# Patient Record
Sex: FEMALE | Race: WHITE | NOT HISPANIC OR LATINO | Employment: OTHER | URBAN - METROPOLITAN AREA
[De-identification: names, ages, dates, MRNs, and addresses within clinical notes are randomized per-mention and may not be internally consistent; named-entity substitution may affect disease eponyms.]

---

## 2017-01-09 ENCOUNTER — LAB VISIT (OUTPATIENT)
Dept: LAB | Facility: HOSPITAL | Age: 82
End: 2017-01-09
Attending: INTERNAL MEDICINE
Payer: MEDICARE

## 2017-01-09 ENCOUNTER — TELEPHONE (OUTPATIENT)
Dept: RHEUMATOLOGY | Facility: CLINIC | Age: 82
End: 2017-01-09

## 2017-01-09 ENCOUNTER — OFFICE VISIT (OUTPATIENT)
Dept: INTERNAL MEDICINE | Facility: CLINIC | Age: 82
End: 2017-01-09
Payer: COMMERCIAL

## 2017-01-09 VITALS
WEIGHT: 109.38 LBS | TEMPERATURE: 98 F | SYSTOLIC BLOOD PRESSURE: 129 MMHG | HEART RATE: 86 BPM | OXYGEN SATURATION: 94 % | BODY MASS INDEX: 20.65 KG/M2 | DIASTOLIC BLOOD PRESSURE: 77 MMHG | HEIGHT: 61 IN

## 2017-01-09 DIAGNOSIS — C49.22 LEIOMYOSARCOMA OF LEFT THIGH: ICD-10-CM

## 2017-01-09 DIAGNOSIS — Z00.00 ENCOUNTER FOR PREVENTIVE HEALTH EXAMINATION: ICD-10-CM

## 2017-01-09 DIAGNOSIS — M81.0 OP (OSTEOPOROSIS): ICD-10-CM

## 2017-01-09 DIAGNOSIS — E55.9 VITAMIN D DEFICIENCY: Primary | ICD-10-CM

## 2017-01-09 DIAGNOSIS — E55.9 VITAMIN D DEFICIENCY: ICD-10-CM

## 2017-01-09 LAB
25(OH)D3+25(OH)D2 SERPL-MCNC: >96 NG/ML
ALBUMIN SERPL BCP-MCNC: 3.8 G/DL
ALP SERPL-CCNC: 81 U/L
ALT SERPL W/O P-5'-P-CCNC: 12 U/L
ANION GAP SERPL CALC-SCNC: 10 MMOL/L
AST SERPL-CCNC: 23 U/L
BASOPHILS # BLD AUTO: 0.03 K/UL
BASOPHILS NFR BLD: 0.4 %
BILIRUB SERPL-MCNC: 0.3 MG/DL
BUN SERPL-MCNC: 23 MG/DL
CALCIUM SERPL-MCNC: 9.7 MG/DL
CHLORIDE SERPL-SCNC: 104 MMOL/L
CHOLEST/HDLC SERPL: 3.2 {RATIO}
CO2 SERPL-SCNC: 24 MMOL/L
CREAT SERPL-MCNC: 1 MG/DL
DIFFERENTIAL METHOD: ABNORMAL
EOSINOPHIL # BLD AUTO: 0.1 K/UL
EOSINOPHIL NFR BLD: 1.7 %
ERYTHROCYTE [DISTWIDTH] IN BLOOD BY AUTOMATED COUNT: 13.1 %
EST. GFR  (AFRICAN AMERICAN): >60 ML/MIN/1.73 M^2
EST. GFR  (NON AFRICAN AMERICAN): 52.2 ML/MIN/1.73 M^2
GLUCOSE SERPL-MCNC: 92 MG/DL
HCT VFR BLD AUTO: 42.6 %
HDL/CHOLESTEROL RATIO: 30.8 %
HDLC SERPL-MCNC: 227 MG/DL
HDLC SERPL-MCNC: 70 MG/DL
HGB BLD-MCNC: 14.5 G/DL
LDLC SERPL CALC-MCNC: 139.8 MG/DL
LYMPHOCYTES # BLD AUTO: 2 K/UL
LYMPHOCYTES NFR BLD: 26.9 %
MCH RBC QN AUTO: 32.4 PG
MCHC RBC AUTO-ENTMCNC: 34 %
MCV RBC AUTO: 95 FL
MONOCYTES # BLD AUTO: 0.6 K/UL
MONOCYTES NFR BLD: 8.6 %
NEUTROPHILS # BLD AUTO: 4.7 K/UL
NEUTROPHILS NFR BLD: 62.1 %
NONHDLC SERPL-MCNC: 157 MG/DL
PLATELET # BLD AUTO: 263 K/UL
PMV BLD AUTO: 9.8 FL
POTASSIUM SERPL-SCNC: 4.5 MMOL/L
PROT SERPL-MCNC: 7.2 G/DL
RBC # BLD AUTO: 4.48 M/UL
SODIUM SERPL-SCNC: 138 MMOL/L
T4 FREE SERPL-MCNC: 1.04 NG/DL
TRIGL SERPL-MCNC: 86 MG/DL
TSH SERPL DL<=0.005 MIU/L-ACNC: 4.43 UIU/ML
WBC # BLD AUTO: 7.48 K/UL

## 2017-01-09 PROCEDURE — 99397 PER PM REEVAL EST PAT 65+ YR: CPT | Mod: 25,S$GLB,, | Performed by: INTERNAL MEDICINE

## 2017-01-09 PROCEDURE — 90662 IIV NO PRSV INCREASED AG IM: CPT | Mod: S$GLB,,, | Performed by: INTERNAL MEDICINE

## 2017-01-09 PROCEDURE — 84439 ASSAY OF FREE THYROXINE: CPT

## 2017-01-09 PROCEDURE — 1160F RVW MEDS BY RX/DR IN RCRD: CPT | Mod: S$GLB,,, | Performed by: INTERNAL MEDICINE

## 2017-01-09 PROCEDURE — 90732 PPSV23 VACC 2 YRS+ SUBQ/IM: CPT | Mod: S$GLB,,, | Performed by: INTERNAL MEDICINE

## 2017-01-09 PROCEDURE — 90471 IMMUNIZATION ADMIN: CPT | Mod: S$GLB,,, | Performed by: INTERNAL MEDICINE

## 2017-01-09 PROCEDURE — 80061 LIPID PANEL: CPT

## 2017-01-09 PROCEDURE — 36415 COLL VENOUS BLD VENIPUNCTURE: CPT | Mod: PO

## 2017-01-09 PROCEDURE — 80053 COMPREHEN METABOLIC PANEL: CPT

## 2017-01-09 PROCEDURE — 99999 PR PBB SHADOW E&M-EST. PATIENT-LVL III: CPT | Mod: PBBFAC,,, | Performed by: INTERNAL MEDICINE

## 2017-01-09 PROCEDURE — 1159F MED LIST DOCD IN RCRD: CPT | Mod: S$GLB,,, | Performed by: INTERNAL MEDICINE

## 2017-01-09 PROCEDURE — 1157F ADVNC CARE PLAN IN RCRD: CPT | Mod: S$GLB,,, | Performed by: INTERNAL MEDICINE

## 2017-01-09 PROCEDURE — 84443 ASSAY THYROID STIM HORMONE: CPT

## 2017-01-09 PROCEDURE — 90472 IMMUNIZATION ADMIN EACH ADD: CPT | Mod: S$GLB,,, | Performed by: INTERNAL MEDICINE

## 2017-01-09 PROCEDURE — 82306 VITAMIN D 25 HYDROXY: CPT

## 2017-01-09 PROCEDURE — 85025 COMPLETE CBC W/AUTO DIFF WBC: CPT

## 2017-01-09 NOTE — PROGRESS NOTES
"Subjective:       Patient ID: Esperanza Rubalcava is a 83 y.o. female.    Chief Complaint: No chief complaint on file.    HPI Comments: Here for f/u medical problems and preventive exam.  Never started on Prolia.  PT for balance and arm strain, PT thinks would benefit from more sessions.  Taking twice weekly vit D.  Energy ok.  No f/c/sw/coguh.  No cp/sob/palp.  BMs and urine normal.      HM: 1/17 fluvax, 1/16 [id number], 1/17 today booster [id number], 9/10 TD, 1/11 zostervax, 2/16 BMD hi fx risk rec Prolia, Cscope in past normal.        Review of Systems   Constitutional: Negative for appetite change, chills, diaphoresis and fever.   HENT: Negative for congestion, ear pain, rhinorrhea, sinus pressure and sore throat.    Respiratory: Negative for cough, chest tightness and shortness of breath.    Cardiovascular: Negative for chest pain and palpitations.   Gastrointestinal: Negative for blood in stool, constipation, diarrhea, nausea and vomiting.   Genitourinary: Negative for dysuria, frequency, hematuria, menstrual problem, urgency and vaginal discharge.   Musculoskeletal: Negative for arthralgias.   Skin: Negative for rash.   Neurological: Negative for dizziness and headaches.   Psychiatric/Behavioral: Negative for sleep disturbance. The patient is not nervous/anxious.        Objective:     Visit Vitals    /77 (BP Location: Right arm, Patient Position: Sitting, BP Method: Automatic)    Pulse 86    Temp 97.6 °F (36.4 °C) (Tympanic)    Ht 5' 1" (1.549 m)    Wt 49.6 kg (109 lb 5.6 oz)    SpO2 (!) 94%    BMI 20.66 kg/m2       Physical Exam   Constitutional: She is oriented to person, place, and time. She appears well-developed and well-nourished.   HENT:   Right Ear: External ear normal. Tympanic membrane is not injected.   Left Ear: External ear normal. Tympanic membrane is not injected.   Mouth/Throat: Oropharynx is clear and moist.   Eyes: Conjunctivae are normal.   Neck: Normal range of motion. Neck supple. " Carotid bruit is not present. No thyroid mass and no thyromegaly present.   Cardiovascular: Normal rate, regular rhythm and intact distal pulses.  Exam reveals no gallop and no friction rub.    No murmur heard.  Pulmonary/Chest: Effort normal and breath sounds normal. She has no wheezes. She has no rales.   Abdominal: Soft. Bowel sounds are normal. She exhibits no mass. There is no hepatosplenomegaly. There is no tenderness.   Musculoskeletal: She exhibits no edema.   Lymphadenopathy:     She has no cervical adenopathy.   Neurological: She is alert and oriented to person, place, and time.   Skin: Skin is warm. No rash noted.   Psychiatric: She has a normal mood and affect.       Assessment:       1. Vitamin D deficiency    2. OP (osteoporosis)    3. Leiomyosarcoma of left thigh    4. Encounter for preventive health examination        Plan:       Diagnoses and all orders for this visit:    Vitamin D deficiency-check lab.  -     Vitamin D; Future    OP (osteoporosis)- labs now, sched Rheum appt for prolia.  -     Ambulatory consult to Rheumatology    Leiomyosarcoma of left thigh    Encounter for preventive health examination- utd.  -     Comprehensive metabolic panel; Future  -     Lipid panel; Future  -     CBC auto differential; Future  -     TSH; Future  -     Vitamin D; Future  -     Pneumococcal Polysaccharide Vaccine (23 Valent) (SQ/IM)    RTC 6mo.

## 2017-01-09 NOTE — MR AVS SNAPSHOT
[facility]  [address] [place] [address]  Phone: [phone]  Fax: [phone]                  Esperanza Rubalcava   2017 1:40 PM   Office Visit    Description:  Female : 1933   Provider:  Slime Kim MD   Department:  [facility] - Internal Medicine           Diagnoses this Visit        Comments    Vitamin D deficiency    -  Primary     OP (osteoporosis)         Leiomyosarcoma of left thigh         Encounter for preventive health examination                To Do List           Future Appointments        Provider Department Dept Phone    2017 5:30 PM LAB, SAME DAY [facility] [phone]    7/10/2017 1:00 PM Slime Kim MD [facility] [phone]      Goals (5 Years of Data)     None      Follow-Up and Disposition     Return in about 6 months (around 2017).      [facility]     [facility] -  Assistance  Registered nurses in the [facility] provide clinical advisement, health education, appointment booking, and other advisory services.  Call for this free service at [phone].             Medications           Message regarding Medications     Verify the changes and/or additions to your medication regime listed below are the same as discussed with your clinician today.  If any of these changes or additions are incorrect, please notify your healthcare provider.             Verify that the below list of medications is an accurate representation of the medications you are currently taking.  If none reported, the list may be blank. If incorrect, please contact your healthcare provider. Carry this list with you in case of emergency.           Current Medications     cholecalciferol, vitamin D3, (DECARA) 50,000 unit capsule Take 1 capsule (50,000 Units total) by mouth every Monday and Thursday.    cyanocobalamin, vitamin B-12, (VITAMIN B-12) 1,000 mcg Subl Place 1 Deisi under  "the tongue once daily.           Clinical Reference Information           Vital Signs - Last Recorded  Most recent update: 1/9/2017  1:52 PM by Lázaro Yip MA    BP Pulse Temp Ht    129/77 (BP Location: Right arm, Patient Position: Sitting, BP Method: Automatic) 86 97.6 °F (36.4 °C) (Tympanic) 5' 1" (1.549 m)    Wt SpO2 BMI    49.6 kg (109 lb 5.6 oz) (!) 94% 20.66 kg/m2      Blood Pressure          Most Recent Value    BP  129/77      Allergies as of 1/9/2017     No Known Allergies      Immunizations Administered on Date of Encounter - 1/9/2017     Name Date Dose VIS Date Route    Pneumococcal Polysaccharide - 23 Valent  Incomplete 0.5 mL 4/24/2015 Intramuscular      Orders Placed During Today's Visit      Normal Orders This Visit    Ambulatory consult to Rheumatology     Pneumococcal Polysaccharide Vaccine (23 Valent) (SQ/IM)     Future Labs/Procedures Expected by Expires    CBC auto differential  1/9/2017 1/9/2018    Comprehensive metabolic panel  1/9/2017 1/9/2018    Lipid panel  1/9/2017 1/9/2018    TSH  1/9/2017 1/9/2018    Vitamin D  As directed 1/9/2018      "

## 2017-02-21 ENCOUNTER — LAB VISIT (OUTPATIENT)
Dept: LAB | Facility: HOSPITAL | Age: 82
End: 2017-02-21
Attending: INTERNAL MEDICINE
Payer: MEDICARE

## 2017-02-21 ENCOUNTER — OFFICE VISIT (OUTPATIENT)
Dept: RHEUMATOLOGY | Facility: CLINIC | Age: 82
End: 2017-02-21
Payer: MEDICARE

## 2017-02-21 VITALS
SYSTOLIC BLOOD PRESSURE: 168 MMHG | HEIGHT: 61 IN | DIASTOLIC BLOOD PRESSURE: 84 MMHG | BODY MASS INDEX: 21.06 KG/M2 | WEIGHT: 111.56 LBS | HEART RATE: 97 BPM

## 2017-02-21 DIAGNOSIS — M81.0 OP (OSTEOPOROSIS): ICD-10-CM

## 2017-02-21 DIAGNOSIS — M81.0 OP (OSTEOPOROSIS): Primary | ICD-10-CM

## 2017-02-21 DIAGNOSIS — Z91.81 RISK FOR FALLS: ICD-10-CM

## 2017-02-21 DIAGNOSIS — C49.22 LEIOMYOSARCOMA OF LEFT THIGH: ICD-10-CM

## 2017-02-21 DIAGNOSIS — E55.9 VITAMIN D DEFICIENCY: ICD-10-CM

## 2017-02-21 DIAGNOSIS — S52.501D CLOSED FRACTURE OF DISTAL END OF RIGHT RADIUS WITH ROUTINE HEALING, UNSPECIFIED FRACTURE MORPHOLOGY, SUBSEQUENT ENCOUNTER: ICD-10-CM

## 2017-02-21 LAB
ALBUMIN SERPL BCP-MCNC: 3.6 G/DL
ALP SERPL-CCNC: 84 U/L
ALT SERPL W/O P-5'-P-CCNC: 10 U/L
ANION GAP SERPL CALC-SCNC: 9 MMOL/L
AST SERPL-CCNC: 19 U/L
BILIRUB SERPL-MCNC: 0.2 MG/DL
BUN SERPL-MCNC: 19 MG/DL
CALCIUM SERPL-MCNC: 9.9 MG/DL
CHLORIDE SERPL-SCNC: 109 MMOL/L
CO2 SERPL-SCNC: 25 MMOL/L
CREAT SERPL-MCNC: 1.2 MG/DL
EST. GFR  (AFRICAN AMERICAN): 48 ML/MIN/1.73 M^2
EST. GFR  (NON AFRICAN AMERICAN): 42 ML/MIN/1.73 M^2
GLUCOSE SERPL-MCNC: 93 MG/DL
POTASSIUM SERPL-SCNC: 4.2 MMOL/L
PROT SERPL-MCNC: 7 G/DL
SODIUM SERPL-SCNC: 143 MMOL/L

## 2017-02-21 PROCEDURE — 80053 COMPREHEN METABOLIC PANEL: CPT | Mod: PO

## 2017-02-21 PROCEDURE — 36415 COLL VENOUS BLD VENIPUNCTURE: CPT | Mod: PO

## 2017-02-21 PROCEDURE — 99214 OFFICE O/P EST MOD 30 MIN: CPT | Mod: S$GLB,,, | Performed by: INTERNAL MEDICINE

## 2017-02-21 PROCEDURE — 99999 PR PBB SHADOW E&M-EST. PATIENT-LVL III: CPT | Mod: PBBFAC,,, | Performed by: INTERNAL MEDICINE

## 2017-02-21 NOTE — PROGRESS NOTES
"Subjective:       Patient ID: Esperanza Rubalcava is a 84 y.o. female.    Chief Complaint: Osteoporosis    HPI   Referred by PCP For management of OP, she has hx of distal end of R radius fracture. She actually saw Jennifer Schneider for OP 4/4/2016. It was noted that she took fosamax many years ago but doesn't remember the duration of therapy. She had R wrist in a cast from distal R elbow to MCP joints . She was diagnosed with severe OP and also vitamin D deficiency. Paln was for Prolia and started ergo 50K weekly.    She received Prolia 5/2/2016 and she is here today to make sure she gets her next injection.   She just stopped the vitamin D tablet she was taking twice weekly because Vitamin D >96. She likes cheese and eats dairy products.   Since she has fallen she has been with one of her three children.  She uses a walker no recent falls.    Review of Systems   Constitutional: Negative for chills and fever.   HENT: Negative.    Eyes: Negative.    Respiratory: Negative.    Cardiovascular: Negative.    Gastrointestinal: Negative.    Endocrine: Negative.    Genitourinary: Negative.    Musculoskeletal: Negative.    Neurological: Negative.    Hematological: Negative.    Psychiatric/Behavioral: Negative.          Objective:     Visit Vitals    BP (!) 168/84    Pulse 97    Ht 5' 1" (1.549 m)    Wt 50.6 kg (111 lb 8.8 oz)    BMI 21.08 kg/m2        Physical Exam   Vitals reviewed.  Constitutional: She is oriented to person, place, and time and well-developed, well-nourished, and in no distress. No distress.   HENT:   Head: Normocephalic and atraumatic.   Right Ear: External ear normal.   Left Ear: External ear normal.   Mouth/Throat: Oropharynx is clear and moist.   Dentition good     Eyes: Conjunctivae are normal. Pupils are equal, round, and reactive to light. Right eye exhibits no discharge. Left eye exhibits no discharge. No scleral icterus.   Neck: Neck supple.   Cardiovascular: Normal rate, regular rhythm and normal " heart sounds.    Pulmonary/Chest: Effort normal. No respiratory distress.   Neurological: She is alert and oriented to person, place, and time. No cranial nerve deficit. She exhibits normal muscle tone.   Hip flexors and quads 5/5  Can get out of chair with minimal hand use but balance is poor     Skin: Skin is warm and dry. No rash noted. She is not diaphoretic. No erythema.     Psychiatric: Mood, memory, affect and judgment normal.   Musculoskeletal: Normal range of motion. She exhibits no tenderness.         LABS REVIEWED   Ref. Range 10/24/2011 10:08 9/17/2012 08:37 4/14/2016 14:57 1/9/2017 15:13   Vit D, 25-Hydroxy Latest Ref Range: 30 - 96 ng/mL 11 (L) 10 (L) 18 (L) >96 (A)     Results for GREGOR LOPEZ (MRN [id number]) as of 2/21/2017 14:26   Ref. Range 1/9/2017 15:13   WBC Latest Ref Range: 3.90 - 12.70 K/uL 7.48   RBC Latest Ref Range: 4.00 - 5.40 M/uL 4.48   Hemoglobin Latest Ref Range: 12.0 - 16.0 g/dL 14.5   Hematocrit Latest Ref Range: 37.0 - 48.5 % 42.6   MCV Latest Ref Range: 82 - 98 fL 95   MCH Latest Ref Range: 27.0 - 31.0 pg 32.4 (H)   MCHC Latest Ref Range: 32.0 - 36.0 % 34.0   RDW Latest Ref Range: 11.5 - 14.5 % 13.1   Platelets Latest Ref Range: 150 - 350 K/uL 263   MPV Latest Ref Range: 9.2 - 12.9 fL 9.8   Gran% Latest Ref Range: 38.0 - 73.0 % 62.1   Gran # Latest Ref Range: 1.8 - 7.7 K/uL 4.7   Lymph% Latest Ref Range: 18.0 - 48.0 % 26.9   Lymph # Latest Ref Range: 1.0 - 4.8 K/uL 2.0   Mono% Latest Ref Range: 4.0 - 15.0 % 8.6   Mono # Latest Ref Range: 0.3 - 1.0 K/uL 0.6   Eosinophil% Latest Ref Range: 0.0 - 8.0 % 1.7   Eos # Latest Ref Range: 0.0 - 0.5 K/uL 0.1   Basophil% Latest Ref Range: 0.0 - 1.9 % 0.4   Baso # Latest Ref Range: 0.00 - 0.20 K/uL 0.03   Sodium Latest Ref Range: 136 - 145 mmol/L 138   Potassium Latest Ref Range: 3.5 - 5.1 mmol/L 4.5   Chloride Latest Ref Range: 95 - 110 mmol/L 104   CO2 Latest Ref Range: 23 - 29 mmol/L 24   Anion Gap Latest Ref Range: 8 - 16 mmol/L 10    BUN, Bld Latest Ref Range: 8 - 23 mg/dL 23   Creatinine Latest Ref Range: 0.5 - 1.4 mg/dL 1.0   eGFR if non African American Latest Ref Range: >60 mL/min/1.73 m^2 52.2 (A)   eGFR if African American Latest Ref Range: >60 mL/min/1.73 m^2 >60.0   Glucose Latest Ref Range: 70 - 110 mg/dL 92   Calcium Latest Ref Range: 8.7 - 10.5 mg/dL 9.7   Alkaline Phosphatase Latest Ref Range: 55 - 135 U/L 81   Total Protein Latest Ref Range: 6.0 - 8.4 g/dL 7.2   Albumin Latest Ref Range: 3.5 - 5.2 g/dL 3.8   Total Bilirubin Latest Ref Range: 0.1 - 1.0 mg/dL 0.3   AST Latest Ref Range: 10 - 40 U/L 23   ALT Latest Ref Range: 10 - 44 U/L 12   Triglycerides Latest Ref Range: 30 - 150 mg/dL 86   Cholesterol Latest Ref Range: 120 - 199 mg/dL 227 (H)   HDL Latest Ref Range: 40 - 75 mg/dL 70   LDL Cholesterol Latest Ref Range: 63.0 - 159.0 mg/dL 139.8   Total Cholesterol/HDL Ratio Latest Ref Range: 2.0 - 5.0  3.2   Vit D, 25-Hydroxy Latest Ref Range: 30 - 96 ng/mL >96 (A)   TSH Latest Ref Range: 0.400 - 4.000 uIU/mL 4.432 (H)   Free T4 Latest Ref Range: 0.71 - 1.51 ng/dL 1.04     IMAGING  BMD 1/2016 osteoporosis     Assessment:       1. OP (osteoporosis)    2. Closed fracture of distal end of right radius with routine healing, unspecified fracture morphology, subsequent encounter    3. Vitamin D deficiency    4. Risk for falls    5. Leiomyosarcoma of left thigh        OP with hx of R radial fracture after mechanical fall- had prolia 5/2016 without issue, due again ASAP.    Vit d deficiency- resolved,  completed ergo 50K biweekly, recent vitamin D 1/2017 very high so off all replacement.    Fall risk- uses walker, lives with daughters. Had some mini strokes so balance is off.     Hx leiomyosarcoma of left thigh- no longer following up with oncology, no recurrence. No jaw involvement.     Plan:     cmp today and if ok she should get Prolia approved and scheduled and then follow-up in 6 months    Stay off vitamin D for now, repeat level in  6m-1 year if insurance permits    Fall prevention discussed, she had PT discussing fall prevention     Repeat BMD 1/2018    rtc in 6 months for Taras MANE

## 2017-02-21 NOTE — PATIENT INSTRUCTIONS
Fall Prevention  Falls often occur due to slipping, tripping or losing your balance. Millions of people fall every year and injure themselves. Here are ways to reduce your risk of falling again.  · Think about your fall, was there anything that caused your fall that can be fixed, removed, or replaced?  · Make your home safe by keeping walkways clear of objects you may trip over.  · Use non-slip pads under rugs. Do not use area rugs or small throw rugs.  · Use non-slip mats in bathtubs and showers.  · Install handrails and lights on staircases.  · Do not walk in poorly lit areas.  · Do not stand on chairs or wobbly ladders.  · Use caution when reaching overhead or looking upward. This position can cause a loss of balance.  · Be sure your shoes fit properly, have non-slip bottoms and are in good condition.   · Wear shoes both inside and out. Avoid going barefoot or wearing slippers.  · Be cautious when going up and down stairs, curbs, and when walking on uneven sidewalks.  · If your balance is poor, consider using a cane or walker.  · If your fall was related to alcohol use, stop or limit alcohol intake.   · If your fall was related to use of sleeping medicines, talk to your doctor about this. You may need to reduce your dosage at bedtime if you awaken during the night to go to the bathroom.    · To reduce the need for nighttime bathroom trips:  ¨ Avoid drinking fluids for several hours before going to bed  ¨ Empty your bladder before going to bed  ¨ Men can keep a urinal at the bedside  · Stay as active as you can. Balance, flexibility, strength, and endurance all come from exercise. They all play a role in preventing falls. Ask your healthcare provider which types of activity are right for you.  · Get your vision checked on a regular basis.  · If you have pets, know where they are before you stand up or walk so you don't trip over them.  · Use night lights.  Date Last Reviewed: 11/5/2015  © 2448-8334 [employer]. [address] [place] [address], [place] [address]. All rights reserved. This information is not intended as a substitute for professional medical care. Always follow your healthcare professional's instructions.

## 2017-02-21 NOTE — MR AVS SNAPSHOT
[facility]  [address] [place] [address]  Phone: [phone]  Fax: [phone]                  Esperanza Rubalcava   2017 2:00 PM   Office Visit    Description:  Female : 1933   Provider:  Ivana Neff MD   Department:  [facility] - Rheumatology           Reason for Visit     Osteoporosis           Diagnoses this Visit        Comments    OP (osteoporosis)    -  Primary     Closed fracture of distal end of right radius with routine healing, unspecified fracture morphology, subsequent encounter         Vitamin D deficiency         Risk for falls         Leiomyosarcoma of left thigh                To Do List           Future Appointments        Provider Department Dept Phone    2017 2:35 PM LABORATORY, [facility] [phone]    3/7/2017 9:00 AM RHEUMATOLOGY NURSE, [facility] [phone]    7/10/2017 1:00 PM Slime Kim MD [facility] [phone]      Goals (5 Years of Data)     None      [facility]     [facility] -  Assistance  Registered nurses in the [facility] provide clinical advisement, health education, appointment booking, and other advisory services.  Call for this free service at [phone].             Medications           Message regarding Medications     Verify the changes and/or additions to your medication regime listed below are the same as discussed with your clinician today.  If any of these changes or additions are incorrect, please notify your healthcare provider.        STOP taking these medications     cholecalciferol, vitamin D3, (DECARA) 50,000 unit capsule Take 1 capsule (50,000 Units total) by mouth every Monday and Thursday.           Verify that the below list of medications is an accurate representation of the medications you are currently taking.  If none reported, the list may be blank. If incorrect, please contact your  "healthcare provider. Carry this list with you in case of emergency.           Current Medications     cyanocobalamin, vitamin B-12, (VITAMIN B-12) 1,000 mcg Subl Place 1 Deisi under the tongue once daily.           Clinical Reference Information           Your Vitals Were     BP Pulse Height Weight BMI    168/84 97 5' 1" (1.549 m) 50.6 kg (111 lb 8.8 oz) 21.08 kg/m2      Blood Pressure          Most Recent Value    BP  (!)  168/84      Allergies as of 2/21/2017     No Known Allergies      Immunizations Administered on Date of Encounter - 2/21/2017     None      Orders Placed During Today's Visit      Normal Orders This Visit    Prior Authorization Order     Recurring Lab Work Interval Expires    Basic metabolic panel   4/22/2018    Comprehensive metabolic panel   2/21/2021      Instructions      Fall Prevention  Falls often occur due to slipping, tripping or losing your balance. Millions of people fall every year and injure themselves. Here are ways to reduce your risk of falling again.  · Think about your fall, was there anything that caused your fall that can be fixed, removed, or replaced?  · Make your home safe by keeping walkways clear of objects you may trip over.  · Use non-slip pads under rugs. Do not use area rugs or small throw rugs.  · Use non-slip mats in bathtubs and showers.  · Install handrails and lights on staircases.  · Do not walk in poorly lit areas.  · Do not stand on chairs or wobbly ladders.  · Use caution when reaching overhead or looking upward. This position can cause a loss of balance.  · Be sure your shoes fit properly, have non-slip bottoms and are in good condition.   · Wear shoes both inside and out. Avoid going barefoot or wearing slippers.  · Be cautious when going up and down stairs, curbs, and when walking on uneven sidewalks.  · If your balance is poor, consider using a cane or walker.  · If your fall was related to alcohol use, stop or limit alcohol intake.   · If your fall was " related to use of sleeping medicines, talk to your doctor about this. You may need to reduce your dosage at bedtime if you awaken during the night to go to the bathroom.    · To reduce the need for nighttime bathroom trips:  ¨ Avoid drinking fluids for several hours before going to bed  ¨ Empty your bladder before going to bed  ¨ Men can keep a urinal at the bedside  · Stay as active as you can. Balance, flexibility, strength, and endurance all come from exercise. They all play a role in preventing falls. Ask your healthcare provider which types of activity are right for you.  · Get your vision checked on a regular basis.  · If you have pets, know where they are before you stand up or walk so you don't trip over them.  · Use night lights.  Date Last Reviewed: 11/5/2015 © 2000-2016 [employer]. [address] [place] [address]. All rights reserved. This information is not intended as a substitute for professional medical care. Always follow your healthcare professional's instructions.             Language Assistance Services     ATTENTION: Language assistance services are available, free of charge. Please call [phone].      ATENCIÓN: Si habla español, tiene a reyna disposición servicios gratuitos de asistencia lingüística. Llame al [phone].     NIURKA Ý: N?u b?n nói Ti?ng Vi?t, có các d?ch v? h? tr? ngôn ng? mi?n phí dành cho b?n. G?i s? [phone].         [facility] complies with applicable Federal civil rights laws and does not discriminate on the basis of race, color, national origin, age, disability, or sex.

## 2017-03-02 ENCOUNTER — TELEPHONE (OUTPATIENT)
Dept: RHEUMATOLOGY | Facility: CLINIC | Age: 82
End: 2017-03-02

## 2017-03-02 NOTE — TELEPHONE ENCOUNTER
----- Message from Virgil Gomez LPN sent at 3/2/2017  3:12 PM CST -----  Contact: Patients daughter, Thania      ----- Message -----     From: Carolyn Reyna     Sent: 3/2/2017   2:51 PM       To: [facility] Clinical Staff    Ms Monteiro needs to change her mothers nurse visit, please call her back at [phone]. Thank you

## 2017-03-06 ENCOUNTER — PATIENT MESSAGE (OUTPATIENT)
Dept: RHEUMATOLOGY | Facility: CLINIC | Age: 82
End: 2017-03-06

## 2017-03-07 ENCOUNTER — CLINICAL SUPPORT (OUTPATIENT)
Dept: RHEUMATOLOGY | Facility: CLINIC | Age: 82
End: 2017-03-07
Payer: MEDICARE

## 2017-03-07 PROCEDURE — 96372 THER/PROPH/DIAG INJ SC/IM: CPT | Mod: S$GLB,,, | Performed by: INTERNAL MEDICINE

## 2017-03-07 RX ADMIN — DENOSUMAB 60 MG: 60 INJECTION SUBCUTANEOUS at 10:03

## 2017-03-07 NOTE — PROGRESS NOTES
Administered 1cc Prolia 60mg/cc to LLQ of abdomen. Pt. Tolerated well. No acute reaction noted to site. Pt. Instructed on S/S of reaction to report. Pt. Verbalized understanding.    Lot:[id number]  Exp:04/19  Manu:[email or web address]

## 2017-05-09 ENCOUNTER — TELEPHONE (OUTPATIENT)
Dept: RHEUMATOLOGY | Facility: CLINIC | Age: 82
End: 2017-05-09

## 2017-05-09 NOTE — TELEPHONE ENCOUNTER
----- Message from Bianca Austin sent at 5/9/2017 10:21 AM CDT -----  Contact: daughter/Robyn Rubalcava  States she's returning Khushboo's call. Please call Robyn Rubalcava at [phone]. Thank you

## 2017-05-09 NOTE — TELEPHONE ENCOUNTER
Spoke with Thania. Appointment with Dr. Neff 9-8 has been rescheduled for 9-8 with Sofiya Hampton because Dr. Neff is leaving end of June.

## 2017-06-28 DIAGNOSIS — M81.0 OSTEOPOROSIS, UNSPECIFIED OSTEOPOROSIS TYPE, UNSPECIFIED PATHOLOGICAL FRACTURE PRESENCE: Primary | ICD-10-CM

## 2017-07-11 ENCOUNTER — OFFICE VISIT (OUTPATIENT)
Dept: INTERNAL MEDICINE | Facility: CLINIC | Age: 82
End: 2017-07-11
Payer: MEDICARE

## 2017-07-11 ENCOUNTER — HOSPITAL ENCOUNTER (OUTPATIENT)
Dept: RADIOLOGY | Facility: HOSPITAL | Age: 82
Discharge: HOME | End: 2017-07-11
Attending: INTERNAL MEDICINE
Payer: MEDICARE

## 2017-07-11 VITALS
HEART RATE: 84 BPM | HEIGHT: 61 IN | WEIGHT: 116.88 LBS | SYSTOLIC BLOOD PRESSURE: 152 MMHG | BODY MASS INDEX: 22.07 KG/M2 | DIASTOLIC BLOOD PRESSURE: 84 MMHG | OXYGEN SATURATION: 98 % | TEMPERATURE: 97 F

## 2017-07-11 DIAGNOSIS — R05.9 COUGH: ICD-10-CM

## 2017-07-11 DIAGNOSIS — C49.22 LEIOMYOSARCOMA OF LEFT THIGH: ICD-10-CM

## 2017-07-11 DIAGNOSIS — Z29.9 PREVENTIVE MEASURE: ICD-10-CM

## 2017-07-11 DIAGNOSIS — M81.0 AGE-RELATED OSTEOPOROSIS WITHOUT CURRENT PATHOLOGICAL FRACTURE: ICD-10-CM

## 2017-07-11 DIAGNOSIS — R03.0 ELEVATED BP WITHOUT DIAGNOSIS OF HYPERTENSION: ICD-10-CM

## 2017-07-11 DIAGNOSIS — E55.9 VITAMIN D DEFICIENCY: Primary | ICD-10-CM

## 2017-07-11 PROCEDURE — 1159F MED LIST DOCD IN RCRD: CPT | Mod: S$GLB,,, | Performed by: INTERNAL MEDICINE

## 2017-07-11 PROCEDURE — 99999 PR PBB SHADOW E&M-EST. PATIENT-LVL III: CPT | Mod: PBBFAC,,, | Performed by: INTERNAL MEDICINE

## 2017-07-11 PROCEDURE — 71020 XR CHEST PA AND LATERAL: CPT | Mod: TC,PO

## 2017-07-11 PROCEDURE — 1126F AMNT PAIN NOTED NONE PRSNT: CPT | Mod: S$GLB,,, | Performed by: INTERNAL MEDICINE

## 2017-07-11 PROCEDURE — 71020 XR CHEST PA AND LATERAL: CPT | Mod: 26,,, | Performed by: RADIOLOGY

## 2017-07-11 PROCEDURE — 99214 OFFICE O/P EST MOD 30 MIN: CPT | Mod: S$GLB,,, | Performed by: INTERNAL MEDICINE

## 2017-07-11 NOTE — PROGRESS NOTES
"Subjective:       Patient ID: Esperanza Rubalcava is a 84 y.o. female.    Chief Complaint: Follow-up and Sinus Problem    Here for follow up of medical problems.  Head and chest congestion x 1 week, getting better.  Not taking vit D supplement since was high about 6mo ago.  Had prolia inj in 3/17.  Doesn't check BPs.  No f/c/sw/n/v/d.  No cp/sob/palp.    Updated/ annual due 1/18:  HM: 1/17 fluvax, 1/16 [id number], 1/17 booster [id number], 9/10 TD, 1/11 zostervax, 2/16 BMD, Cscope in past normal.          Review of Systems   Constitutional: Negative for chills, diaphoresis and fever.   HENT: Positive for postnasal drip.    Respiratory: Positive for cough. Negative for shortness of breath.    Cardiovascular: Negative for chest pain, palpitations and leg swelling.   Gastrointestinal: Negative for blood in stool, constipation, diarrhea, nausea and vomiting.   Genitourinary: Negative for dysuria, frequency and hematuria.   Psychiatric/Behavioral: The patient is not nervous/anxious.        Objective:   BP (!) 152/84   Pulse 84   Temp 97 °F (36.1 °C) (Tympanic)   Ht 5' 1" (1.549 m)   Wt 53 kg (116 lb 13.5 oz)   SpO2 98%   BMI 22.08 kg/m²     Physical Exam   Constitutional: She is oriented to person, place, and time. She appears well-developed.   HENT:   Right Ear: External ear normal. Tympanic membrane is not injected.   Left Ear: External ear normal. Tympanic membrane is not injected.   Mouth/Throat: Oropharynx is clear and moist.   Eyes: Conjunctivae are normal.   Neck: Neck supple. Carotid bruit is not present. No thyroid mass and no thyromegaly present.   Cardiovascular: Normal rate, regular rhythm and intact distal pulses.  Exam reveals no gallop and no friction rub.    No murmur heard.  Pulmonary/Chest: Effort normal. She has no wheezes. She has rales (few RUL.).   Abdominal: Soft. Bowel sounds are normal. She exhibits no mass. There is no hepatosplenomegaly. There is no tenderness.   Musculoskeletal: She exhibits " no edema.   Lymphadenopathy:     She has no cervical adenopathy.   Neurological: She is alert and oriented to person, place, and time.   Psychiatric: She has a normal mood and affect.       Assessment:       1. Vitamin D deficiency    2. Age-related osteoporosis without current pathological fracture    3. Leiomyosarcoma of left thigh    4. Preventive measure    5. Elevated BP without diagnosis of hypertension    6. Cough        Plan:       Esperanza was seen today for follow-up and sinus problem.    Diagnoses and all orders for this visit:    Vitamin D deficiency, off supplement since too high in 1/17- recheck now.  -     Vitamin D; Future    Age-related osteoporosis without current pathological fracture on Prolia.  -     Vitamin D; Future    Leiomyosarcoma of left thigh    Cough and poss rales RUL- CXR now with DC and abic if infiltrate.    Preventive measure- not due until 1/18.    Elevated BP without diagnosis of hypertension- monitor BPs for next visit.

## 2017-09-07 PROBLEM — Z51.81 MEDICATION MONITORING ENCOUNTER: Status: ACTIVE | Noted: 2017-09-07

## 2017-09-07 NOTE — PROGRESS NOTES
Subjective:       Patient ID: Esperanza Rubalcava is a 84 y.o. female.    Chief Complaint: osteoporosis    Esperanza is here for follow up for OP.  She has a h/o Right distal radius rx (about a year ago).  She took fosamax many years ago as well as high dose vit d.  Vit D returned to normal and actually was high so all replacement was stopped.  Last level in June in normal range.  She had prolia last year starting on 5/2016 and missed a dose.   She Re established care with Dr. COLORADO earlier this year and had her injection in march this year.  Last shot 3.7.17.   She uses a walker to ambulate. No falls/fxs.       Review of Systems   Constitutional: Negative for chills, fatigue and fever.   HENT: Negative for mouth sores, rhinorrhea and sore throat.    Eyes: Negative for pain and redness.   Respiratory: Negative for cough and shortness of breath.    Cardiovascular: Negative for chest pain.   Gastrointestinal: Negative for abdominal pain, constipation, diarrhea, nausea and vomiting.   Genitourinary: Negative for dysuria and hematuria.   Musculoskeletal: Positive for arthralgias. Negative for joint swelling and myalgias.   Skin: Negative for rash.   Neurological: Negative for weakness, numbness and headaches.   Psychiatric/Behavioral: The patient is not nervous/anxious.          Objective:   BP (!) 149/71   Pulse 83   Wt 53.8 kg (118 lb 9.7 oz)   BMI 22.41 kg/m²      Physical Exam   Constitutional: She is oriented to person, place, and time and well-developed, well-nourished, and in no distress.   HENT:   Head: Normocephalic and atraumatic.   Eyes: Pupils are equal, round, and reactive to light. Right eye exhibits no discharge.   Neck: Normal range of motion.   Cardiovascular: Normal rate, regular rhythm and normal heart sounds.  Exam reveals no friction rub.    Pulmonary/Chest: Effort normal and breath sounds normal. No respiratory distress.   Abdominal: Soft. She exhibits no distension. There is no tenderness.    Lymphadenopathy:     She has no cervical adenopathy.   Neurological: She is alert and oriented to person, place, and time.   Skin: No rash noted. No erythema.     Psychiatric: Mood normal.   Musculoskeletal: Normal range of motion. She exhibits no edema or deformity.   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 with bony enlargements to her pip and dips  No synovitis   Upper ext strength 5/5   Left hip flexor strength 4.5  Right hip flexor 5/5           Recent Results (from the past 168 hour(s))   Basic metabolic panel    Collection Time: 09/08/17  8:32 AM   Result Value Ref Range    Sodium 142 136 - 145 mmol/L    Potassium 4.3 3.5 - 5.1 mmol/L    Chloride 105 95 - 110 mmol/L    CO2 28 23 - 29 mmol/L    Glucose 100 70 - 110 mg/dL    BUN, Bld 15 8 - 23 mg/dL    Creatinine 0.9 0.5 - 1.4 mg/dL    Calcium 9.8 8.7 - 10.5 mg/dL    Anion Gap 9 8 - 16 mmol/L    eGFR if African American >60 >60 mL/min/1.73 m^2    eGFR if non African American 59 (A) >60 mL/min/1.73 m^2        Dexa 1.25.16: DF -2.8, NM -3.0, spine -1.6, decreasing bmd   Assessment:       1. Age-related osteoporosis without current pathological fracture    2. Medication monitoring encounter        Impression:  Osteoporosis: h/o fosamax, h/o right wrist fx a year ago, now on prolia q 6mos; no recent falls/fxs  Medication Monitoring: no issue with prolia    Plan:       Ok for prolia today and repeat in 6 months  Ok to stay off vit D for now,  Get calcium in diet  Order dexa next visit    rtc in 6 months with bmp

## 2017-09-08 ENCOUNTER — OFFICE VISIT (OUTPATIENT)
Dept: RHEUMATOLOGY | Facility: CLINIC | Age: 82
End: 2017-09-08
Payer: MEDICARE

## 2017-09-08 ENCOUNTER — LAB VISIT (OUTPATIENT)
Dept: LAB | Facility: HOSPITAL | Age: 82
End: 2017-09-08
Attending: INTERNAL MEDICINE
Payer: MEDICARE

## 2017-09-08 VITALS
HEART RATE: 83 BPM | DIASTOLIC BLOOD PRESSURE: 71 MMHG | WEIGHT: 118.63 LBS | SYSTOLIC BLOOD PRESSURE: 149 MMHG | BODY MASS INDEX: 22.41 KG/M2

## 2017-09-08 DIAGNOSIS — M81.0 AGE-RELATED OSTEOPOROSIS WITHOUT CURRENT PATHOLOGICAL FRACTURE: Primary | ICD-10-CM

## 2017-09-08 DIAGNOSIS — S52.501D CLOSED FRACTURE OF DISTAL END OF RIGHT RADIUS WITH ROUTINE HEALING, UNSPECIFIED FRACTURE MORPHOLOGY, SUBSEQUENT ENCOUNTER: ICD-10-CM

## 2017-09-08 DIAGNOSIS — Z51.81 MEDICATION MONITORING ENCOUNTER: ICD-10-CM

## 2017-09-08 DIAGNOSIS — M81.0 OP (OSTEOPOROSIS): ICD-10-CM

## 2017-09-08 LAB
ANION GAP SERPL CALC-SCNC: 9 MMOL/L
BUN SERPL-MCNC: 15 MG/DL
CALCIUM SERPL-MCNC: 9.8 MG/DL
CHLORIDE SERPL-SCNC: 105 MMOL/L
CO2 SERPL-SCNC: 28 MMOL/L
CREAT SERPL-MCNC: 0.9 MG/DL
EST. GFR  (AFRICAN AMERICAN): >60 ML/MIN/1.73 M^2
EST. GFR  (NON AFRICAN AMERICAN): 59 ML/MIN/1.73 M^2
GLUCOSE SERPL-MCNC: 100 MG/DL
POTASSIUM SERPL-SCNC: 4.3 MMOL/L
SODIUM SERPL-SCNC: 142 MMOL/L

## 2017-09-08 PROCEDURE — 1126F AMNT PAIN NOTED NONE PRSNT: CPT | Mod: S$GLB,,, | Performed by: PHYSICIAN ASSISTANT

## 2017-09-08 PROCEDURE — 1159F MED LIST DOCD IN RCRD: CPT | Mod: S$GLB,,, | Performed by: PHYSICIAN ASSISTANT

## 2017-09-08 PROCEDURE — 80048 BASIC METABOLIC PNL TOTAL CA: CPT | Mod: PO

## 2017-09-08 PROCEDURE — 99999 PR PBB SHADOW E&M-EST. PATIENT-LVL III: CPT | Mod: PBBFAC,,, | Performed by: PHYSICIAN ASSISTANT

## 2017-09-08 PROCEDURE — 36415 COLL VENOUS BLD VENIPUNCTURE: CPT | Mod: PO

## 2017-09-08 PROCEDURE — 96372 THER/PROPH/DIAG INJ SC/IM: CPT | Mod: S$GLB,,, | Performed by: PHYSICIAN ASSISTANT

## 2017-09-08 PROCEDURE — 99214 OFFICE O/P EST MOD 30 MIN: CPT | Mod: 25,S$GLB,, | Performed by: PHYSICIAN ASSISTANT

## 2017-09-08 PROCEDURE — 3008F BODY MASS INDEX DOCD: CPT | Mod: S$GLB,,, | Performed by: PHYSICIAN ASSISTANT

## 2017-09-08 RX ADMIN — DENOSUMAB 60 MG: 60 INJECTION SUBCUTANEOUS at 09:09

## 2017-09-08 NOTE — PROGRESS NOTES
Administered 1cc Prolia 60mg/cc to RLQ of abdomen. Pt. Tolerated well. No acute reaction noted to site. Pt. Instructed on S/S of reaction to report. Pt. Verbalized understanding.    Lot:[id number]  Exp:02/19  Manu:[email or web address]

## 2017-09-08 NOTE — LETTER
September 8, 2017      Ivana Neff MD  [address]  [address]  [place] [address]           [facility] Rheumatology  [address]  [place] [address]  Phone: [phone]  Fax: [phone]          Patient: Esperanza Rubalcava   MR Number: [id number]   YOB: 1933   Date of Visit: 9/8/2017       Dear Dr. Ivana Neff:    Thank you for referring Esperanza Rubalcava to me for evaluation. Attached you will find relevant portions of my assessment and plan of care.    If you have questions, please do not hesitate to call me. I look forward to following Esperanza Rubalcava along with you.    Sincerely,    MARY Buschosure  CC:  No Recipients    If you would like to receive this communication electronically, please contact [email or web address] or [phone] to request more information on [employer] access.    For providers and/or their staff who would like to refer a patient to [facility], please contact us through our one-stop-shop provider referral line, [facility], at [phone].    If you feel you have received this communication in error or would no longer like to receive these types of communications, please e-mail [email or web address]

## 2017-11-14 ENCOUNTER — OFFICE VISIT (OUTPATIENT)
Dept: INTERNAL MEDICINE | Facility: CLINIC | Age: 82
End: 2017-11-14
Payer: MEDICARE

## 2017-11-14 VITALS
TEMPERATURE: 98 F | DIASTOLIC BLOOD PRESSURE: 84 MMHG | HEART RATE: 89 BPM | SYSTOLIC BLOOD PRESSURE: 132 MMHG | WEIGHT: 122.13 LBS | HEIGHT: 61 IN | BODY MASS INDEX: 23.06 KG/M2 | OXYGEN SATURATION: 98 %

## 2017-11-14 DIAGNOSIS — Z29.9 PREVENTIVE MEASURE: ICD-10-CM

## 2017-11-14 DIAGNOSIS — R03.0 ELEVATED BP WITHOUT DIAGNOSIS OF HYPERTENSION: Primary | ICD-10-CM

## 2017-11-14 DIAGNOSIS — E55.9 VITAMIN D DEFICIENCY: ICD-10-CM

## 2017-11-14 DIAGNOSIS — C49.22 LEIOMYOSARCOMA OF LEFT THIGH: ICD-10-CM

## 2017-11-14 DIAGNOSIS — M81.0 AGE-RELATED OSTEOPOROSIS WITHOUT CURRENT PATHOLOGICAL FRACTURE: ICD-10-CM

## 2017-11-14 PROCEDURE — 99213 OFFICE O/P EST LOW 20 MIN: CPT | Mod: S$GLB,,, | Performed by: INTERNAL MEDICINE

## 2017-11-14 PROCEDURE — G0008 ADMIN INFLUENZA VIRUS VAC: HCPCS | Mod: S$GLB,,, | Performed by: INTERNAL MEDICINE

## 2017-11-14 PROCEDURE — 99999 PR PBB SHADOW E&M-EST. PATIENT-LVL III: CPT | Mod: PBBFAC,,, | Performed by: INTERNAL MEDICINE

## 2017-11-14 PROCEDURE — 90662 IIV NO PRSV INCREASED AG IM: CPT | Mod: S$GLB,,, | Performed by: INTERNAL MEDICINE

## 2017-11-14 NOTE — PROGRESS NOTES
"Subjective:       Patient ID: Esperanza Rubalcava is a 84 y.o. female.    Chief Complaint: Follow-up    Here for follow up of medical problems.  Didn't bring BPs.  No f/c/sw/cough.  No cp/sob/palp.  Gained a few pounds.  BMs and urine normal.    Updated/ annual due 1/18:  HM: 11/17 today fluvax, 1/16 [id number], 1/17 booster [id number], 9/10 TD, 1/11 zostervax, 2/16 BMD, Cscope in past normal.          Review of Systems   Constitutional: Negative for chills, diaphoresis and fever.   Respiratory: Negative for cough and shortness of breath.    Cardiovascular: Negative for chest pain, palpitations and leg swelling.   Gastrointestinal: Negative for blood in stool, constipation, diarrhea, nausea and vomiting.   Genitourinary: Negative for dysuria, frequency and hematuria.   Psychiatric/Behavioral: The patient is not nervous/anxious.        Objective:   /84 (BP Location: Right arm, Patient Position: Sitting, BP Method: Medium (Manual))   Pulse 89   Temp 97.7 °F (36.5 °C) (Tympanic)   Ht 5' 1" (1.549 m)   Wt 55.4 kg (122 lb 2.2 oz)   SpO2 98%   BMI 23.08 kg/m²     Physical Exam   Constitutional: She is oriented to person, place, and time. She appears well-developed.   HENT:   Mouth/Throat: Oropharynx is clear and moist.   Neck: Neck supple. Carotid bruit is not present. No thyroid mass present.   Cardiovascular: Normal rate, regular rhythm and intact distal pulses.  Exam reveals no gallop and no friction rub.    No murmur heard.  Pulmonary/Chest: Effort normal and breath sounds normal. She has no wheezes. She has no rales.   Abdominal: Soft. Bowel sounds are normal. She exhibits no mass. There is no hepatosplenomegaly. There is no tenderness.   Musculoskeletal: She exhibits no edema.   Lymphadenopathy:     She has no cervical adenopathy.   Neurological: She is alert and oriented to person, place, and time.   Psychiatric: She has a normal mood and affect.       Assessment:       1. Elevated BP without diagnosis of " hypertension    2. Vitamin D deficiency    3. Age-related osteoporosis without current pathological fracture    4. Leiomyosarcoma of left thigh        Plan:       Esperanza was seen today for follow-up.    Diagnoses and all orders for this visit:    Elevated BP without diagnosis of hypertension- doing well.    Vitamin D deficiency- off supplement now, recheck 4mo.    Age-related osteoporosis without current pathological fracture- on prolia.    Leiomyosarcoma of left thigh    Preventive measure- will do in 4mo:  -     Comprehensive metabolic panel; Future  -     CBC auto differential; Future  -     TSH; Future  -     Vitamin D; Future

## 2017-11-16 ENCOUNTER — PATIENT MESSAGE (OUTPATIENT)
Dept: INTERNAL MEDICINE | Facility: CLINIC | Age: 82
End: 2017-11-16

## 2018-03-06 ENCOUNTER — PATIENT OUTREACH (OUTPATIENT)
Dept: ADMINISTRATIVE | Facility: HOSPITAL | Age: 83
End: 2018-03-06

## 2018-03-07 NOTE — PROGRESS NOTES
Subjective:       Patient ID: Esperanza Rubalcava is a 85 y.o. female.    Chief Complaint: osteoporosis    Esperanza is here for follow up for OP.  She has a h/o Right distal radius rx (about 1.5 yr ago).  She took fosamax many years ago as well as high dose vit d.  Vit D returned to normal and actually was high so all replacement was stopped.  Last level in June in normal range.  She has had 3 doses of prolia so far, last done sept 2017.. NO issues tolerating prolia.   She uses a walker to ambulate. No falls/fxs. No complaints today       Review of Systems   Constitutional: Negative for chills, fatigue and fever.   HENT: Negative for mouth sores, rhinorrhea and sore throat.    Eyes: Negative for pain and redness.   Respiratory: Negative for cough and shortness of breath.    Cardiovascular: Negative for chest pain.   Gastrointestinal: Negative for abdominal pain, constipation, diarrhea, nausea and vomiting.   Genitourinary: Negative for dysuria and hematuria.   Musculoskeletal: Positive for arthralgias and gait problem. Negative for joint swelling and myalgias.   Skin: Negative for rash.   Neurological: Negative for weakness, numbness and headaches.   Psychiatric/Behavioral: The patient is not nervous/anxious.          Objective:   /85   Pulse 99   Wt 57.6 kg (126 lb 15.8 oz)   BMI 23.99 kg/m²      Physical Exam   Constitutional: She is oriented to person, place, and time and well-developed, well-nourished, and in no distress.   HENT:   Head: Normocephalic and atraumatic.   Eyes: Pupils are equal, round, and reactive to light. Right eye exhibits no discharge.   Neck: Normal range of motion.   Cardiovascular: Normal rate, regular rhythm and normal heart sounds.  Exam reveals no friction rub.    Pulmonary/Chest: Effort normal and breath sounds normal. No respiratory distress.   Abdominal: Soft. She exhibits no distension. There is no tenderness.   Lymphadenopathy:     She has no cervical adenopathy.    Neurological: She is alert and oriented to person, place, and time.   Skin: No rash noted. No erythema.     Psychiatric: Mood normal.   Musculoskeletal: Normal range of motion. She exhibits no edema or deformity.   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 with bony enlargements to her pip and dips  No synovitis     Walks with walker           Recent Results (from the past 168 hour(s))   CBC auto differential    Collection Time: 03/09/18  8:13 AM   Result Value Ref Range    WBC 5.19 3.90 - 12.70 K/uL    RBC 4.88 4.00 - 5.40 M/uL    Hemoglobin 15.1 12.0 - 16.0 g/dL    Hematocrit 46.4 37.0 - 48.5 %    MCV 95 82 - 98 fL    MCH 30.9 27.0 - 31.0 pg    MCHC 32.5 32.0 - 36.0 g/dL    RDW 12.9 11.5 - 14.5 %    Platelets 224 150 - 350 K/uL    MPV 9.6 9.2 - 12.9 fL    Gran # (ANC) 2.9 1.8 - 7.7 K/uL    Lymph # 1.5 1.0 - 4.8 K/uL    Mono # 0.6 0.3 - 1.0 K/uL    Eos # 0.2 0.0 - 0.5 K/uL    Baso # 0.04 0.00 - 0.20 K/uL    Gran% 55.1 38.0 - 73.0 %    Lymph% 27.9 18.0 - 48.0 %    Mono% 11.8 4.0 - 15.0 %    Eosinophil% 4.4 0.0 - 8.0 %    Basophil% 0.8 0.0 - 1.9 %    Differential Method Automated         Dexa 1.25.16: DF -2.8, NM -3.0, spine -1.6, decreasing bmd   Assessment:       1. Age-related osteoporosis without current pathological fracture    2. Medication monitoring encounter        Impression:  Osteoporosis: h/o fosamax, h/o right wrist fx 1.5 year ago, now on prolia (x 3 doses) q 6mos; no recent falls/fxs    Medication Monitoring: no issue with prolia, ca normal, gfr 46, will watch     Plan:       Ok for prolia #4 today and repeat in 6 months  rec daily vit D 1000u, her level is in normal range, ok for daily lower dose  Get calcium in diet  Repeat dexa next visit   Chew 2 tums daily x 2 weeks  Hydrate       rtc in 6 months with bmp, dexa, prolia

## 2018-03-09 ENCOUNTER — OFFICE VISIT (OUTPATIENT)
Dept: RHEUMATOLOGY | Facility: CLINIC | Age: 83
End: 2018-03-09
Payer: MEDICARE

## 2018-03-09 ENCOUNTER — LAB VISIT (OUTPATIENT)
Dept: LAB | Facility: HOSPITAL | Age: 83
End: 2018-03-09
Attending: PHYSICIAN ASSISTANT
Payer: MEDICARE

## 2018-03-09 VITALS
DIASTOLIC BLOOD PRESSURE: 85 MMHG | WEIGHT: 127 LBS | BODY MASS INDEX: 23.99 KG/M2 | SYSTOLIC BLOOD PRESSURE: 135 MMHG | HEART RATE: 99 BPM

## 2018-03-09 DIAGNOSIS — M81.0 AGE-RELATED OSTEOPOROSIS WITHOUT CURRENT PATHOLOGICAL FRACTURE: ICD-10-CM

## 2018-03-09 DIAGNOSIS — Z51.81 MEDICATION MONITORING ENCOUNTER: ICD-10-CM

## 2018-03-09 DIAGNOSIS — E55.9 VITAMIN D DEFICIENCY: ICD-10-CM

## 2018-03-09 DIAGNOSIS — Z29.9 PREVENTIVE MEASURE: ICD-10-CM

## 2018-03-09 DIAGNOSIS — M81.0 AGE-RELATED OSTEOPOROSIS WITHOUT CURRENT PATHOLOGICAL FRACTURE: Primary | ICD-10-CM

## 2018-03-09 LAB
25(OH)D3+25(OH)D2 SERPL-MCNC: 33 NG/ML
ALBUMIN SERPL BCP-MCNC: 3.9 G/DL
ALP SERPL-CCNC: 72 U/L
ALT SERPL W/O P-5'-P-CCNC: 10 U/L
ANION GAP SERPL CALC-SCNC: 10 MMOL/L
ANION GAP SERPL CALC-SCNC: 10 MMOL/L
AST SERPL-CCNC: 20 U/L
BASOPHILS # BLD AUTO: 0.04 K/UL
BASOPHILS NFR BLD: 0.8 %
BILIRUB SERPL-MCNC: 0.5 MG/DL
BUN SERPL-MCNC: 15 MG/DL
BUN SERPL-MCNC: 15 MG/DL
CALCIUM SERPL-MCNC: 10.2 MG/DL
CALCIUM SERPL-MCNC: 10.2 MG/DL
CHLORIDE SERPL-SCNC: 103 MMOL/L
CHLORIDE SERPL-SCNC: 103 MMOL/L
CO2 SERPL-SCNC: 26 MMOL/L
CO2 SERPL-SCNC: 26 MMOL/L
CREAT SERPL-MCNC: 1.1 MG/DL
CREAT SERPL-MCNC: 1.1 MG/DL
DIFFERENTIAL METHOD: NORMAL
EOSINOPHIL # BLD AUTO: 0.2 K/UL
EOSINOPHIL NFR BLD: 4.4 %
ERYTHROCYTE [DISTWIDTH] IN BLOOD BY AUTOMATED COUNT: 12.9 %
EST. GFR  (AFRICAN AMERICAN): 53 ML/MIN/1.73 M^2
EST. GFR  (AFRICAN AMERICAN): 53 ML/MIN/1.73 M^2
EST. GFR  (NON AFRICAN AMERICAN): 46 ML/MIN/1.73 M^2
EST. GFR  (NON AFRICAN AMERICAN): 46 ML/MIN/1.73 M^2
GLUCOSE SERPL-MCNC: 102 MG/DL
GLUCOSE SERPL-MCNC: 102 MG/DL
HCT VFR BLD AUTO: 46.4 %
HGB BLD-MCNC: 15.1 G/DL
LYMPHOCYTES # BLD AUTO: 1.5 K/UL
LYMPHOCYTES NFR BLD: 27.9 %
MCH RBC QN AUTO: 30.9 PG
MCHC RBC AUTO-ENTMCNC: 32.5 G/DL
MCV RBC AUTO: 95 FL
MONOCYTES # BLD AUTO: 0.6 K/UL
MONOCYTES NFR BLD: 11.8 %
NEUTROPHILS # BLD AUTO: 2.9 K/UL
NEUTROPHILS NFR BLD: 55.1 %
PLATELET # BLD AUTO: 224 K/UL
PMV BLD AUTO: 9.6 FL
POTASSIUM SERPL-SCNC: 4.4 MMOL/L
POTASSIUM SERPL-SCNC: 4.4 MMOL/L
PROT SERPL-MCNC: 7.4 G/DL
RBC # BLD AUTO: 4.88 M/UL
SODIUM SERPL-SCNC: 139 MMOL/L
SODIUM SERPL-SCNC: 139 MMOL/L
T4 FREE SERPL-MCNC: 0.98 NG/DL
TSH SERPL DL<=0.005 MIU/L-ACNC: 4.77 UIU/ML
WBC # BLD AUTO: 5.19 K/UL

## 2018-03-09 PROCEDURE — 84439 ASSAY OF FREE THYROXINE: CPT

## 2018-03-09 PROCEDURE — 85025 COMPLETE CBC W/AUTO DIFF WBC: CPT | Mod: PO

## 2018-03-09 PROCEDURE — 84443 ASSAY THYROID STIM HORMONE: CPT

## 2018-03-09 PROCEDURE — 99214 OFFICE O/P EST MOD 30 MIN: CPT | Mod: 25,S$GLB,, | Performed by: PHYSICIAN ASSISTANT

## 2018-03-09 PROCEDURE — 99999 PR PBB SHADOW E&M-EST. PATIENT-LVL III: CPT | Mod: PBBFAC,,, | Performed by: PHYSICIAN ASSISTANT

## 2018-03-09 PROCEDURE — 36415 COLL VENOUS BLD VENIPUNCTURE: CPT | Mod: PO

## 2018-03-09 PROCEDURE — 96372 THER/PROPH/DIAG INJ SC/IM: CPT | Mod: S$GLB,,, | Performed by: INTERNAL MEDICINE

## 2018-03-09 PROCEDURE — 82306 VITAMIN D 25 HYDROXY: CPT

## 2018-03-09 PROCEDURE — 80053 COMPREHEN METABOLIC PANEL: CPT | Mod: PO

## 2018-03-09 RX ADMIN — DENOSUMAB 60 MG: 60 INJECTION SUBCUTANEOUS at 09:03

## 2018-03-09 NOTE — PROGRESS NOTES
Administered 1 cc Prolia 60mg/cc  to LLQ of abdomen. Pt tolerated well. No acute reaction noted to site. Pt instructed on S/S to report. Advised patient to wait in lobby 15 minutes after receiving injection to monitor for any reactions. Pt verbalized understanding.       Calcium: 10.2  Creatinine: 1.1  Lot: [id number]  Exp: 06/20

## 2018-03-20 ENCOUNTER — OFFICE VISIT (OUTPATIENT)
Dept: INTERNAL MEDICINE | Facility: CLINIC | Age: 83
End: 2018-03-20
Payer: MEDICARE

## 2018-03-20 ENCOUNTER — OFFICE VISIT (OUTPATIENT)
Dept: OPHTHALMOLOGY | Facility: CLINIC | Age: 83
End: 2018-03-20
Payer: MEDICARE

## 2018-03-20 VITALS
SYSTOLIC BLOOD PRESSURE: 136 MMHG | HEART RATE: 107 BPM | HEIGHT: 61 IN | OXYGEN SATURATION: 97 % | DIASTOLIC BLOOD PRESSURE: 76 MMHG | BODY MASS INDEX: 24.18 KG/M2 | TEMPERATURE: 97 F | WEIGHT: 128.06 LBS

## 2018-03-20 DIAGNOSIS — H52.4 MYOPIA WITH PRESBYOPIA OF BOTH EYES: ICD-10-CM

## 2018-03-20 DIAGNOSIS — Z13.5 GLAUCOMA SCREENING: ICD-10-CM

## 2018-03-20 DIAGNOSIS — M81.0 AGE-RELATED OSTEOPOROSIS WITHOUT CURRENT PATHOLOGICAL FRACTURE: ICD-10-CM

## 2018-03-20 DIAGNOSIS — E55.9 VITAMIN D DEFICIENCY: ICD-10-CM

## 2018-03-20 DIAGNOSIS — Z91.81 RISK FOR FALLS: ICD-10-CM

## 2018-03-20 DIAGNOSIS — H25.13 AGE-RELATED NUCLEAR CATARACT OF BOTH EYES: Primary | ICD-10-CM

## 2018-03-20 DIAGNOSIS — H52.13 MYOPIA WITH PRESBYOPIA OF BOTH EYES: ICD-10-CM

## 2018-03-20 DIAGNOSIS — C49.22 LEIOMYOSARCOMA OF LEFT THIGH: ICD-10-CM

## 2018-03-20 DIAGNOSIS — N18.30 CKD (CHRONIC KIDNEY DISEASE) STAGE 3, GFR 30-59 ML/MIN: ICD-10-CM

## 2018-03-20 DIAGNOSIS — Z00.00 ENCOUNTER FOR PREVENTIVE HEALTH EXAMINATION: Primary | ICD-10-CM

## 2018-03-20 PROCEDURE — 99999 PR PBB SHADOW E&M-EST. PATIENT-LVL II: CPT | Mod: PBBFAC,,, | Performed by: OPTOMETRIST

## 2018-03-20 PROCEDURE — 92004 COMPRE OPH EXAM NEW PT 1/>: CPT | Mod: S$GLB,,, | Performed by: OPTOMETRIST

## 2018-03-20 PROCEDURE — 99397 PER PM REEVAL EST PAT 65+ YR: CPT | Mod: S$GLB,,, | Performed by: INTERNAL MEDICINE

## 2018-03-20 PROCEDURE — 99999 PR PBB SHADOW E&M-EST. PATIENT-LVL III: CPT | Mod: PBBFAC,,, | Performed by: INTERNAL MEDICINE

## 2018-03-20 RX ORDER — CALCIUM CARBONATE 200(500)MG
1 TABLET,CHEWABLE ORAL DAILY
COMMUNITY
End: 2019-04-09

## 2018-03-20 RX ORDER — MULTIVIT WITH MINERALS/HERBS
1 TABLET ORAL DAILY
COMMUNITY
End: 2019-04-09

## 2018-03-20 NOTE — PROGRESS NOTES
HPI     Eye Exam    Additional comments: Yearly           Comments   NP to [facility]. Last eye exam over 5 years ago. Patient here today for yearly   eye exam.  HPI    Any vision changes since last exam: Yes  Eye pain: No  Other ocular symptoms: No    Do you wear currently wear glasses or contacts? OTC readers    Interested in contacts today? No    Do you plan on getting new glasses today? If needed       Last edited by Charleen Simon, PCT on 3/20/2018  2:54 PM. (History)              Assessment /Plan     For exam results, see Encounter Report.    Age-related nuclear cataract of both eyes    Glaucoma screening    Myopia with presbyopia of both eyes      Cataract(s) not yet affecting activities of daily living, therefore, surgery consult is not yet indicated.  Glaucoma screening and fundus exam normal.  Updated glasses prescription.  Return to clinic 1 yr.

## 2018-03-20 NOTE — PROGRESS NOTES
"Subjective:       Patient ID: Esperanza Rubalcava is a 85 y.o. female.    Chief Complaint: Annual Exam    Here for f/u medical problems and preventive exam.  Walking slowly, usually uses walker.  No falls.  No f/c/sw/cough.  No cp/sob/palp.  BMs and urine normal.  Taking vit D supplement for past 2 weeks only.  Has gained 20# in past 2 years, since less active in general.    HM: 11/17 fluvax, 1/16 [id number], 1/17 booster [id number], 9/10 TD, 1/11 zostervax, 1/16 BMD on prolia, Cscope in past normal.          Review of Systems   Constitutional: Negative for appetite change, chills, diaphoresis and fever.   HENT: Negative for congestion, ear pain, rhinorrhea, sinus pressure and sore throat.    Respiratory: Negative for cough, chest tightness and shortness of breath.    Cardiovascular: Negative for chest pain and palpitations.   Gastrointestinal: Negative for blood in stool, constipation, diarrhea, nausea and vomiting.   Genitourinary: Negative for dysuria, frequency, hematuria, menstrual problem, urgency and vaginal discharge.   Musculoskeletal: Negative for arthralgias.   Skin: Negative for rash.   Neurological: Negative for dizziness and headaches.   Psychiatric/Behavioral: Negative for sleep disturbance. The patient is not nervous/anxious.        Objective:   /76 (BP Location: Right arm, Patient Position: Sitting, BP Method: Medium (Manual))   Pulse 107   Temp 96.7 °F (35.9 °C) (Tympanic)   Ht 5' 1" (1.549 m)   Wt 58.1 kg (128 lb 1.4 oz)   SpO2 97%   BMI 24.20 kg/m²     Physical Exam   Constitutional: She is oriented to person, place, and time. She appears well-developed and well-nourished.   HENT:   Right Ear: External ear normal. Tympanic membrane is not injected.   Left Ear: External ear normal. Tympanic membrane is not injected.   Mouth/Throat: Oropharynx is clear and moist.   Eyes: Conjunctivae are normal.   Neck: Normal range of motion. Neck supple. No thyromegaly present.   Cardiovascular: Normal " rate, regular rhythm and intact distal pulses.  Exam reveals no gallop and no friction rub.    No murmur heard.  Pulmonary/Chest: Effort normal and breath sounds normal. She has no wheezes. She has no rales.   Abdominal: Soft. Bowel sounds are normal. She exhibits no mass. There is no tenderness.   Musculoskeletal: She exhibits no edema.   Lymphadenopathy:     She has no cervical adenopathy.   Neurological: She is alert and oriented to person, place, and time.   Skin: Skin is warm. No rash noted.   Psychiatric: She has a normal mood and affect.         Results for GREGOR LOPEZ (MRN [id number]) as of 3/20/2018 12:52   Ref. Range 3/9/2018 08:13   WBC Latest Ref Range: 3.90 - 12.70 K/uL 5.19   RBC Latest Ref Range: 4.00 - 5.40 M/uL 4.88   Hemoglobin Latest Ref Range: 12.0 - 16.0 g/dL 15.1   Hematocrit Latest Ref Range: 37.0 - 48.5 % 46.4   MCV Latest Ref Range: 82 - 98 fL 95   MCH Latest Ref Range: 27.0 - 31.0 pg 30.9   MCHC Latest Ref Range: 32.0 - 36.0 g/dL 32.5   RDW Latest Ref Range: 11.5 - 14.5 % 12.9   Platelets Latest Ref Range: 150 - 350 K/uL 224   MPV Latest Ref Range: 9.2 - 12.9 fL 9.6   Gran% Latest Ref Range: 38.0 - 73.0 % 55.1   Gran # (ANC) Latest Ref Range: 1.8 - 7.7 K/uL 2.9   Lymph% Latest Ref Range: 18.0 - 48.0 % 27.9   Lymph # Latest Ref Range: 1.0 - 4.8 K/uL 1.5   Mono% Latest Ref Range: 4.0 - 15.0 % 11.8   Mono # Latest Ref Range: 0.3 - 1.0 K/uL 0.6   Eosinophil% Latest Ref Range: 0.0 - 8.0 % 4.4   Eos # Latest Ref Range: 0.0 - 0.5 K/uL 0.2   Basophil% Latest Ref Range: 0.0 - 1.9 % 0.8   Baso # Latest Ref Range: 0.00 - 0.20 K/uL 0.04   Sodium Latest Ref Range: 136 - 145 mmol/L 139   Potassium Latest Ref Range: 3.5 - 5.1 mmol/L 4.4   Chloride Latest Ref Range: 95 - 110 mmol/L 103   CO2 Latest Ref Range: 23 - 29 mmol/L 26   Anion Gap Latest Ref Range: 8 - 16 mmol/L 10   BUN, Bld Latest Ref Range: 8 - 23 mg/dL 15   Creatinine Latest Ref Range: 0.5 - 1.4 mg/dL 1.1   eGFR if non African American  Latest Ref Range: >60 mL/min/1.73 m^2 46 (A)   eGFR if African American Latest Ref Range: >60 mL/min/1.73 m^2 53 (A)   Glucose Latest Ref Range: 70 - 110 mg/dL 102   Calcium Latest Ref Range: 8.7 - 10.5 mg/dL 10.2   Alkaline Phosphatase Latest Ref Range: 55 - 135 U/L    Total Protein Latest Ref Range: 6.0 - 8.4 g/dL    Albumin Latest Ref Range: 3.5 - 5.2 g/dL    Total Bilirubin Latest Ref Range: 0.1 - 1.0 mg/dL    AST Latest Ref Range: 10 - 40 U/L    ALT Latest Ref Range: 10 - 44 U/L    Vit D, 25-Hydroxy Latest Ref Range: 30 - 96 ng/mL 33   TSH Latest Ref Range: 0.400 - 4.000 uIU/mL 4.768 (H)   Free T4 Latest Ref Range: 0.71 - 1.51 ng/dL 0.98     Assessment:       1. Encounter for preventive health examination    2. CKD (chronic kidney disease) stage 3, GFR 30-59 ml/min    3. Age-related osteoporosis without current pathological fracture    4. Leiomyosarcoma of left thigh    5. Risk for falls    6. Vitamin D deficiency        Plan:       Esperanza was seen today for annual exam.    Diagnoses and all orders for this visit:    Encounter for preventive health examination- utd.    CKD (chronic kidney disease) stage 3, GFR 30-59 ml/min- cont to monitor.    Age-related osteoporosis without current pathological fracture on prolia, per Rheum.    Leiomyosarcoma of left thigh    Risk for falls- using walker constantly.    Vitamin D deficiency- continue small supplement, D3 1K daily, recheck 6mo.

## 2018-03-23 ENCOUNTER — PATIENT MESSAGE (OUTPATIENT)
Dept: INTERNAL MEDICINE | Facility: CLINIC | Age: 83
End: 2018-03-23

## 2018-06-01 ENCOUNTER — HOSPITAL ENCOUNTER (OUTPATIENT)
Dept: RADIOLOGY | Facility: HOSPITAL | Age: 83
Discharge: HOME | End: 2018-06-01
Attending: PHYSICIAN ASSISTANT
Payer: MEDICARE

## 2018-06-01 ENCOUNTER — OFFICE VISIT (OUTPATIENT)
Dept: INTERNAL MEDICINE | Facility: CLINIC | Age: 83
End: 2018-06-01
Payer: MEDICARE

## 2018-06-01 VITALS
DIASTOLIC BLOOD PRESSURE: 84 MMHG | HEART RATE: 120 BPM | TEMPERATURE: 101 F | WEIGHT: 127.44 LBS | HEIGHT: 61 IN | SYSTOLIC BLOOD PRESSURE: 128 MMHG | BODY MASS INDEX: 24.06 KG/M2 | OXYGEN SATURATION: 94 %

## 2018-06-01 DIAGNOSIS — R05.9 COUGH: ICD-10-CM

## 2018-06-01 DIAGNOSIS — J01.90 ACUTE SINUSITIS, RECURRENCE NOT SPECIFIED, UNSPECIFIED LOCATION: Primary | ICD-10-CM

## 2018-06-01 DIAGNOSIS — N18.30 CKD (CHRONIC KIDNEY DISEASE) STAGE 3, GFR 30-59 ML/MIN: ICD-10-CM

## 2018-06-01 PROCEDURE — 71046 X-RAY EXAM CHEST 2 VIEWS: CPT | Mod: TC,FY,PO

## 2018-06-01 PROCEDURE — 99213 OFFICE O/P EST LOW 20 MIN: CPT | Mod: S$GLB,,, | Performed by: PHYSICIAN ASSISTANT

## 2018-06-01 PROCEDURE — 99999 PR PBB SHADOW E&M-EST. PATIENT-LVL IV: CPT | Mod: PBBFAC,,, | Performed by: PHYSICIAN ASSISTANT

## 2018-06-01 PROCEDURE — 71046 X-RAY EXAM CHEST 2 VIEWS: CPT | Mod: 26,,, | Performed by: RADIOLOGY

## 2018-06-01 RX ORDER — DOXYCYCLINE 100 MG/1
100 CAPSULE ORAL EVERY 12 HOURS
Qty: 20 CAPSULE | Refills: 0 | Status: SHIPPED | OUTPATIENT
Start: 2018-06-01 | End: 2018-06-11

## 2018-06-01 RX ORDER — BENZONATATE 100 MG/1
100 CAPSULE ORAL 3 TIMES DAILY PRN
Qty: 30 CAPSULE | Refills: 0 | Status: SHIPPED | OUTPATIENT
Start: 2018-06-01 | End: 2018-06-11

## 2018-06-01 NOTE — PROGRESS NOTES
"Subjective:       Patient ID: Esperanza Rubalcava is a 85 y.o. female.    Chief Complaint: URI and Cough    85 year old female presents to clinic with daughter (who she lives with and who is having similar sxs). Pt is new to me. Pt reports rhinorrhea, nasal congestion, dry cough, sneezing, and decreased appetite X 2 days. She reports no known fever, chills, or night sweats but current temp is 100.9F. She reports no sore throat, ear pain, N/V, CP, SOB, wheezing, or other medical complaints. She has taken OTC cold medication with some slight relief.    PCP: Dr. Kim    Patient Active Problem List:     Leiomyosarcoma of left thigh     Age-related osteoporosis without current pathological fracture     Closed fracture of distal end of right radius     Vitamin D deficiency     Risk for falls     Medication monitoring encounter     CKD (chronic kidney disease) stage 3, GFR 30-59 ml/min      Review of Systems   Constitutional: Positive for fever. Negative for chills.   HENT: Positive for congestion and rhinorrhea. Negative for ear pain and sore throat.    Respiratory: Positive for cough. Negative for chest tightness, shortness of breath and wheezing.    Cardiovascular: Negative for chest pain, palpitations and leg swelling.   Gastrointestinal: Negative for nausea and vomiting.   Skin: Negative for rash.   Neurological: Negative for dizziness, weakness, numbness and headaches.   Psychiatric/Behavioral: Negative for confusion.       Objective:       Vitals:    06/01/18 0920   BP: 128/84   BP Location: Right arm   Patient Position: Sitting   BP Method: Small (Manual)   Pulse: (!) 120   Temp: (!) 100.9 °F (38.3 °C)   TempSrc: Tympanic   SpO2: (!) 94%   Weight: 57.8 kg (127 lb 6.8 oz)   Height: 5' 1" (1.549 m)     Physical Exam   Constitutional: She is oriented to person, place, and time. She appears well-developed and well-nourished. No distress.   HENT:   Head: Normocephalic and atraumatic.   Right Ear: Tympanic membrane " and ear canal normal.   Left Ear: Ear canal normal. Tympanic membrane is bulging.   Mouth/Throat: Oropharynx is clear and moist. No oropharyngeal exudate.   Eyes: EOM are normal. No scleral icterus.   Neck: Neck supple.   Cardiovascular: Normal rate, regular rhythm and normal heart sounds.    Pulmonary/Chest: Effort normal. No respiratory distress. She has no decreased breath sounds. She has no wheezes. She has rhonchi. She has no rales.   Musculoskeletal: Normal range of motion. She exhibits no edema.   Neurological: She is alert and oriented to person, place, and time. No cranial nerve deficit.   Skin: Skin is warm and dry. No rash noted.   Psychiatric: She has a normal mood and affect. Her speech is normal and behavior is normal. Thought content normal.       Assessment:       1. Acute sinusitis, recurrence not specified, unspecified location    2. Cough    3. CKD (chronic kidney disease) stage 3, GFR 30-59 ml/min        Plan:         Esperanza was seen today for uri and cough.    Diagnoses and all orders for this visit:    Acute sinusitis, Cough  -     benzonatate (TESSALON) 100 MG capsule; Take 1 capsule (100 mg total) by mouth 3 (three) times daily as needed for Cough.  -     X-Ray Chest PA And Lateral; Future  CXR today with result review and further recommendations following. RTC if sxs persist or worsen. ER if severe sxs occur.    CKD (chronic kidney disease) stage 3, GFR 30-59 ml/min  Avoid NSAIDs and f/u with PCP.    F/u with PCP in 3 months as scheduled for health management.

## 2018-09-10 ENCOUNTER — LAB VISIT (OUTPATIENT)
Dept: LAB | Facility: HOSPITAL | Age: 83
End: 2018-09-10
Attending: PHYSICIAN ASSISTANT
Payer: MEDICARE

## 2018-09-10 ENCOUNTER — OFFICE VISIT (OUTPATIENT)
Dept: RHEUMATOLOGY | Facility: CLINIC | Age: 83
End: 2018-09-10
Payer: MEDICARE

## 2018-09-10 VITALS
WEIGHT: 124.56 LBS | HEART RATE: 78 BPM | SYSTOLIC BLOOD PRESSURE: 156 MMHG | HEIGHT: 61 IN | BODY MASS INDEX: 23.52 KG/M2 | DIASTOLIC BLOOD PRESSURE: 83 MMHG

## 2018-09-10 DIAGNOSIS — Z51.81 MEDICATION MONITORING ENCOUNTER: ICD-10-CM

## 2018-09-10 DIAGNOSIS — E55.9 VITAMIN D DEFICIENCY: ICD-10-CM

## 2018-09-10 DIAGNOSIS — N18.30 CKD (CHRONIC KIDNEY DISEASE) STAGE 3, GFR 30-59 ML/MIN: ICD-10-CM

## 2018-09-10 DIAGNOSIS — M81.0 AGE-RELATED OSTEOPOROSIS WITHOUT CURRENT PATHOLOGICAL FRACTURE: Primary | ICD-10-CM

## 2018-09-10 DIAGNOSIS — M81.0 AGE-RELATED OSTEOPOROSIS WITHOUT CURRENT PATHOLOGICAL FRACTURE: ICD-10-CM

## 2018-09-10 LAB
ANION GAP SERPL CALC-SCNC: 7 MMOL/L
BUN SERPL-MCNC: 15 MG/DL
CALCIUM SERPL-MCNC: 9.8 MG/DL
CHLORIDE SERPL-SCNC: 108 MMOL/L
CO2 SERPL-SCNC: 23 MMOL/L
CREAT SERPL-MCNC: 1 MG/DL
EST. GFR  (AFRICAN AMERICAN): 59 ML/MIN/1.73 M^2
EST. GFR  (NON AFRICAN AMERICAN): 51 ML/MIN/1.73 M^2
GLUCOSE SERPL-MCNC: 97 MG/DL
POTASSIUM SERPL-SCNC: 4.4 MMOL/L
SODIUM SERPL-SCNC: 138 MMOL/L

## 2018-09-10 PROCEDURE — 99213 OFFICE O/P EST LOW 20 MIN: CPT | Mod: PBBFAC,25,PO | Performed by: PHYSICIAN ASSISTANT

## 2018-09-10 PROCEDURE — 1101F PT FALLS ASSESS-DOCD LE1/YR: CPT | Mod: CPTII,,, | Performed by: PHYSICIAN ASSISTANT

## 2018-09-10 PROCEDURE — 36415 COLL VENOUS BLD VENIPUNCTURE: CPT | Mod: PO

## 2018-09-10 PROCEDURE — 99214 OFFICE O/P EST MOD 30 MIN: CPT | Mod: S$PBB,,, | Performed by: PHYSICIAN ASSISTANT

## 2018-09-10 PROCEDURE — 96372 THER/PROPH/DIAG INJ SC/IM: CPT | Mod: PBBFAC,PO

## 2018-09-10 PROCEDURE — 99999 PR PBB SHADOW E&M-EST. PATIENT-LVL III: CPT | Mod: PBBFAC,,, | Performed by: PHYSICIAN ASSISTANT

## 2018-09-10 PROCEDURE — 80048 BASIC METABOLIC PNL TOTAL CA: CPT | Mod: PO

## 2018-09-10 RX ADMIN — DENOSUMAB 60 MG: 60 INJECTION SUBCUTANEOUS at 12:09

## 2018-09-10 NOTE — PROGRESS NOTES
"Subjective:       Patient ID: Esperanza Rubalcava is a 85 y.o. female.    Chief Complaint: osteoporosis    Esperanza is here for follow up for OP.  She has a h/o Right distal radius rx (about 1.5 yr ago).  She took fosamax many years ago as well as high dose vit d.  Vit D returned to normal and actually was high so all replacement was stopped.  Last level in 3/2018 was 33, she has remained off vit D supplement.  No extra Ca supplement or MV is being taken either. She has had 4 doses of prolia so far, last done march 2018. NO issues tolerating prolia.   She uses a walker to ambulate at time. No falls/fxs. No complaints today pain level rated 0/10    Repeat DEXA today 9/10/18      Review of Systems   Constitutional: Negative for chills, fatigue and fever.   HENT: Negative for mouth sores, rhinorrhea and sore throat.    Eyes: Negative for pain and redness.   Respiratory: Negative for cough and shortness of breath.    Cardiovascular: Negative for chest pain.   Gastrointestinal: Negative for abdominal pain, constipation, diarrhea, nausea and vomiting.   Genitourinary: Negative for dysuria and hematuria.   Musculoskeletal: Negative for arthralgias, gait problem, joint swelling and myalgias.   Skin: Negative for rash.   Neurological: Negative for weakness, numbness and headaches.   Psychiatric/Behavioral: The patient is not nervous/anxious.          Objective:   BP (!) 156/83   Pulse 78   Ht 5' 1" (1.549 m)   Wt 56.5 kg (124 lb 9 oz)   BMI 23.54 kg/m²      Physical Exam   Constitutional: She is oriented to person, place, and time and well-developed, well-nourished, and in no distress.   HENT:   Head: Normocephalic and atraumatic.   Eyes: Pupils are equal, round, and reactive to light. Right eye exhibits no discharge.   Neck: Normal range of motion.   Cardiovascular: Normal rate, regular rhythm and normal heart sounds.  Exam reveals no friction rub.    Pulmonary/Chest: Effort normal and breath sounds normal. No respiratory " distress.   Abdominal: Soft. She exhibits no distension. There is no tenderness.   Lymphadenopathy:     She has no cervical adenopathy.   Neurological: She is alert and oriented to person, place, and time.   Skin: No rash noted. No erythema.     Psychiatric: Mood normal.   Musculoskeletal: She exhibits deformity. She exhibits no edema.   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 with bony enlargements to her pip and dips  No synovitis   Scoliosis mid back with left sided curve  No kyphosis   No ttp along her spine                Recent Results (from the past 168 hour(s))   Basic metabolic panel    Collection Time: 09/10/18  9:55 AM   Result Value Ref Range    Sodium 138 136 - 145 mmol/L    Potassium 4.4 3.5 - 5.1 mmol/L    Chloride 108 95 - 110 mmol/L    CO2 23 23 - 29 mmol/L    Glucose 97 70 - 110 mg/dL    BUN, Bld 15 8 - 23 mg/dL    Creatinine 1.0 0.5 - 1.4 mg/dL    Calcium 9.8 8.7 - 10.5 mg/dL    Anion Gap 7 (L) 8 - 16 mmol/L    eGFR if African American 59 (A) >60 mL/min/1.73 m^2    eGFR if non African American 51 (A) >60 mL/min/1.73 m^2        DEXA  9/10/18  Total Femur Mean BMD 0.654 g/cm2 with T score -2.8  Femur neck Mean BMD 0.601 g/cm2 with T score -3.1  Spine L3-L4 BMD 1.028 g/cm2 with T score -1.3  Stable at total femur, slight fall at F neck, improved at spine  Still OP       DEXA  1/25/16  Total Femur Mean BMD 0.660 g/cm2 with T score -2.8  Femur neck Mean BMD 0.620 g/cm2 with T score -3.0  Spine L3-L4 BMD 1.012 g/cm2 with T score -1.6  OP      Assessment:       1. Age-related osteoporosis without current pathological fracture    2. Vitamin D deficiency    3. CKD (chronic kidney disease) stage 3, GFR 30-59 ml/min    4. Medication monitoring encounter        Impression:  Osteoporosis: h/o fosamax, h/o right wrist fx 1.5 year ago, now on prolia (x 4 doses) q 6mos; no recent falls/fxs  Repeat dexa 9/10/18- slight fall femur neck mean,stable at total femur,  impromved spine- still OP  Will c/w Prolia     Medication  Monitoring: no issue with prolia, ca normal, gfr 46, will watch     Plan:         Labs reviewed and done within past 14 days  Ca 9.8, Creat 1.0, eGFR 51  No contraindication for Prolia today, ok for nurse to administer today  Re-evaluate patient again in 6 months to determine if Prolia still medically indicated and appropriate to administer.    KDIGO lab monitoring will be followed according to KDIGO 2017 Clinical Practice Guideline Update for the Diagnosis, Evaluation, Prevention, and Treatment of Chronic Kidney Disease-Mineral and Bone Disorder (CKD-MBD)  Chapter 3.1: Diagnosis of CKD-MBD: biochemical abnormalities    Ok to stay off supplemental vit D for now, will check next visit    Repeat dexa in 2-3 year if falling bmd consider forteo/tymlos if appropriate, christy if any fx occur    Chew 2 tums daily x 2 weeks  Hydrate       rtc in 6 months with cmp, vit d and  Prolia

## 2018-09-10 NOTE — PROGRESS NOTES
Administered 1cc Prolia 60mg/cc to LLQ of abdomen. Ca 9.8 Creat 1.0 Pt. Tolerated well. No acute reaction noted to site. Pt. Instructed on S/S of reaction to report. Pt. Verbalized understanding. Pt waited 15 minutes.    Lot:[id number]  Exp:11/20  Manu:[email or web address]

## 2018-09-11 ENCOUNTER — PATIENT MESSAGE (OUTPATIENT)
Dept: RHEUMATOLOGY | Facility: CLINIC | Age: 83
End: 2018-09-11

## 2018-10-02 ENCOUNTER — OFFICE VISIT (OUTPATIENT)
Dept: INTERNAL MEDICINE | Facility: CLINIC | Age: 83
End: 2018-10-02
Payer: MEDICARE

## 2018-10-02 VITALS
DIASTOLIC BLOOD PRESSURE: 64 MMHG | SYSTOLIC BLOOD PRESSURE: 102 MMHG | TEMPERATURE: 99 F | HEIGHT: 61 IN | HEART RATE: 74 BPM | BODY MASS INDEX: 23.47 KG/M2 | OXYGEN SATURATION: 95 % | WEIGHT: 124.31 LBS

## 2018-10-02 DIAGNOSIS — E55.9 VITAMIN D DEFICIENCY: ICD-10-CM

## 2018-10-02 DIAGNOSIS — C49.22 LEIOMYOSARCOMA OF LEFT THIGH: ICD-10-CM

## 2018-10-02 DIAGNOSIS — Z29.9 PREVENTIVE MEASURE: ICD-10-CM

## 2018-10-02 DIAGNOSIS — N18.30 CKD (CHRONIC KIDNEY DISEASE) STAGE 3, GFR 30-59 ML/MIN: Primary | ICD-10-CM

## 2018-10-02 DIAGNOSIS — M81.0 AGE-RELATED OSTEOPOROSIS WITHOUT CURRENT PATHOLOGICAL FRACTURE: ICD-10-CM

## 2018-10-02 PROCEDURE — 99999 PR PBB SHADOW E&M-EST. PATIENT-LVL III: CPT | Mod: PBBFAC,,, | Performed by: INTERNAL MEDICINE

## 2018-10-02 PROCEDURE — 99213 OFFICE O/P EST LOW 20 MIN: CPT | Mod: PBBFAC,PO | Performed by: INTERNAL MEDICINE

## 2018-10-02 PROCEDURE — 1101F PT FALLS ASSESS-DOCD LE1/YR: CPT | Mod: CPTII,,, | Performed by: INTERNAL MEDICINE

## 2018-10-02 PROCEDURE — 90662 IIV NO PRSV INCREASED AG IM: CPT | Mod: PBBFAC,PO

## 2018-10-02 PROCEDURE — 99213 OFFICE O/P EST LOW 20 MIN: CPT | Mod: S$PBB,,, | Performed by: INTERNAL MEDICINE

## 2018-10-02 NOTE — PROGRESS NOTES
"Subjective:      Patient ID: Esperanza Rubalcava is a 85 y.o. female.    Chief Complaint: Follow-up (6 month)      HPI  Here for follow up of medical problems.  Off vit D, per Rheum.  On Prolia.  Uses walker for longer walk.  No f/c/sw/cough.  No cp/sob/palp.  BMs normal.      9/18 BMD:  FINDINGS:  The L1 to L4 vertebral bone mineral density is equal to 1.066 g/cm squared with a T score of -1.0.  There has been a 1% increase relative to the prior study.    The left femoral neck bone mineral density is equal to 0.579 g/cm squared with a T score of -3.3.  There has been  a 0.2% increase relative to the prior study.      Impression       Osteoporosis           Updated/ annual due 3/19:  HM: 10/18 today fluvax, 1/16 [id number], 1/17 booster [id number], 9/10 TD, 1/11 zostervax, 9/18 BMD on prolia, Cscope in past normal.     Review of Systems   Constitutional: Negative for activity change, chills, diaphoresis, fever and unexpected weight change.   HENT: Negative for hearing loss, rhinorrhea and trouble swallowing.    Eyes: Negative for discharge and visual disturbance.   Respiratory: Negative for cough, chest tightness, shortness of breath and wheezing.    Cardiovascular: Negative for chest pain, palpitations and leg swelling.   Gastrointestinal: Negative for blood in stool, constipation, diarrhea, nausea and vomiting.   Endocrine: Negative for polydipsia and polyuria.   Genitourinary: Negative for difficulty urinating, dysuria, frequency, hematuria and menstrual problem.   Musculoskeletal: Negative for arthralgias, joint swelling and neck pain.   Neurological: Negative for weakness and headaches.   Psychiatric/Behavioral: Negative for confusion and dysphoric mood. The patient is not nervous/anxious.          Objective:   /64 (BP Location: Right arm, Patient Position: Sitting)   Pulse 74   Temp 98.5 °F (36.9 °C) (Tympanic)   Ht 5' 1" (1.549 m)   Wt 56.4 kg (124 lb 5.4 oz)   SpO2 95%   BMI 23.49 kg/m²     Physical " Exam   Constitutional: She is oriented to person, place, and time. She appears well-developed.   HENT:   Mouth/Throat: Oropharynx is clear and moist.   Neck: Neck supple. Carotid bruit is not present. No thyroid mass present.   Cardiovascular: Normal rate, regular rhythm and intact distal pulses. Exam reveals no gallop and no friction rub.   No murmur heard.  Pulmonary/Chest: Effort normal and breath sounds normal. She has no wheezes. She has no rales.   Abdominal: Soft. Bowel sounds are normal. She exhibits no mass. There is no hepatosplenomegaly. There is no tenderness.   Musculoskeletal: She exhibits no edema.   Lymphadenopathy:     She has no cervical adenopathy.   Neurological: She is alert and oriented to person, place, and time.   Psychiatric: She has a normal mood and affect.       Results for orders placed or performed in visit on 09/10/18   Basic metabolic panel   Result Value Ref Range    Sodium 138 136 - 145 mmol/L    Potassium 4.4 3.5 - 5.1 mmol/L    Chloride 108 95 - 110 mmol/L    CO2 23 23 - 29 mmol/L    Glucose 97 70 - 110 mg/dL    BUN, Bld 15 8 - 23 mg/dL    Creatinine 1.0 0.5 - 1.4 mg/dL    Calcium 9.8 8.7 - 10.5 mg/dL    Anion Gap 7 (L) 8 - 16 mmol/L    eGFR if African American 59 (A) >60 mL/min/1.73 m^2    eGFR if non African American 51 (A) >60 mL/min/1.73 m^2         Assessment:       1. CKD (chronic kidney disease) stage 3, GFR 30-59 ml/min    2. Age-related osteoporosis without current pathological fracture    3. Vitamin D deficiency    4. Leiomyosarcoma of left thigh    5. Preventive measure          Plan:     CKD (chronic kidney disease) stage 3, GFR 30-59 ml/min- stable.    Age-related osteoporosis without current pathological fracture on prolia.    Vitamin D deficiency off supplement.  -     Vitamin D; Future    Leiomyosarcoma of left thigh    Preventive measure- lab in 6mo.  Discussed pt needs to get Shingrix vaccination at pharmacy.  -     CBC auto differential; Future; Expected date:  10/02/2018  -     Comprehensive metabolic panel; Future; Expected date: 10/02/2018  -     TSH; Future; Expected date: 10/02/2018  -     Vitamin D; Future

## 2019-02-13 ENCOUNTER — TELEPHONE (OUTPATIENT)
Dept: RHEUMATOLOGY | Facility: CLINIC | Age: 84
End: 2019-02-13

## 2019-02-13 NOTE — TELEPHONE ENCOUNTER
----- Message from Jennifer Schneider PA-C sent at 2/13/2019 11:52 AM CST -----  Prolia 3/12/19    Prolia TP placed  Please make infusion apt and assign referral

## 2019-03-08 ENCOUNTER — LAB VISIT (OUTPATIENT)
Dept: LAB | Facility: HOSPITAL | Age: 84
End: 2019-03-08
Attending: PHYSICIAN ASSISTANT
Payer: MEDICARE

## 2019-03-08 DIAGNOSIS — E55.9 VITAMIN D DEFICIENCY: ICD-10-CM

## 2019-03-08 DIAGNOSIS — M81.0 AGE-RELATED OSTEOPOROSIS WITHOUT CURRENT PATHOLOGICAL FRACTURE: ICD-10-CM

## 2019-03-08 LAB
25(OH)D3+25(OH)D2 SERPL-MCNC: 30 NG/ML
ALBUMIN SERPL BCP-MCNC: 3.7 G/DL
ALP SERPL-CCNC: 77 U/L
ALT SERPL W/O P-5'-P-CCNC: 10 U/L
ANION GAP SERPL CALC-SCNC: 9 MMOL/L
AST SERPL-CCNC: 19 U/L
BILIRUB SERPL-MCNC: 0.5 MG/DL
BUN SERPL-MCNC: 12 MG/DL
CALCIUM SERPL-MCNC: 10.3 MG/DL
CHLORIDE SERPL-SCNC: 104 MMOL/L
CO2 SERPL-SCNC: 26 MMOL/L
CREAT SERPL-MCNC: 1.1 MG/DL
EST. GFR  (AFRICAN AMERICAN): 52.5 ML/MIN/1.73 M^2
EST. GFR  (NON AFRICAN AMERICAN): 45.6 ML/MIN/1.73 M^2
GLUCOSE SERPL-MCNC: 122 MG/DL
POTASSIUM SERPL-SCNC: 4.3 MMOL/L
PROT SERPL-MCNC: 7.2 G/DL
SODIUM SERPL-SCNC: 139 MMOL/L

## 2019-03-08 PROCEDURE — 82306 VITAMIN D 25 HYDROXY: CPT

## 2019-03-08 PROCEDURE — 80053 COMPREHEN METABOLIC PANEL: CPT

## 2019-03-08 PROCEDURE — 36415 COLL VENOUS BLD VENIPUNCTURE: CPT | Mod: PO

## 2019-03-12 ENCOUNTER — OFFICE VISIT (OUTPATIENT)
Dept: RHEUMATOLOGY | Facility: CLINIC | Age: 84
End: 2019-03-12
Payer: MEDICARE

## 2019-03-12 ENCOUNTER — INFUSION (OUTPATIENT)
Dept: RHEUMATOLOGY | Facility: HOSPITAL | Age: 84
End: 2019-03-12
Attending: PHYSICIAN ASSISTANT
Payer: MEDICARE

## 2019-03-12 VITALS
HEART RATE: 87 BPM | DIASTOLIC BLOOD PRESSURE: 86 MMHG | HEIGHT: 61 IN | SYSTOLIC BLOOD PRESSURE: 159 MMHG | WEIGHT: 126.75 LBS | BODY MASS INDEX: 23.93 KG/M2

## 2019-03-12 VITALS — WEIGHT: 126.75 LBS | BODY MASS INDEX: 23.95 KG/M2

## 2019-03-12 DIAGNOSIS — E55.9 VITAMIN D DEFICIENCY: ICD-10-CM

## 2019-03-12 DIAGNOSIS — Z91.81 RISK FOR FALLS: ICD-10-CM

## 2019-03-12 DIAGNOSIS — N18.30 CKD (CHRONIC KIDNEY DISEASE) STAGE 3, GFR 30-59 ML/MIN: ICD-10-CM

## 2019-03-12 DIAGNOSIS — Z51.81 MEDICATION MONITORING ENCOUNTER: ICD-10-CM

## 2019-03-12 DIAGNOSIS — M81.0 AGE-RELATED OSTEOPOROSIS WITHOUT CURRENT PATHOLOGICAL FRACTURE: Primary | ICD-10-CM

## 2019-03-12 PROCEDURE — 99999 PR PBB SHADOW E&M-EST. PATIENT-LVL III: CPT | Mod: PBBFAC,,, | Performed by: PHYSICIAN ASSISTANT

## 2019-03-12 PROCEDURE — 99214 PR OFFICE/OUTPT VISIT, EST, LEVL IV, 30-39 MIN: ICD-10-PCS | Mod: S$GLB,,, | Performed by: PHYSICIAN ASSISTANT

## 2019-03-12 PROCEDURE — 99214 OFFICE O/P EST MOD 30 MIN: CPT | Mod: S$GLB,,, | Performed by: PHYSICIAN ASSISTANT

## 2019-03-12 PROCEDURE — 1101F PR PT FALLS ASSESS DOC 0-1 FALLS W/OUT INJ PAST YR: ICD-10-PCS | Mod: CPTII,S$GLB,, | Performed by: PHYSICIAN ASSISTANT

## 2019-03-12 PROCEDURE — 1101F PT FALLS ASSESS-DOCD LE1/YR: CPT | Mod: CPTII,S$GLB,, | Performed by: PHYSICIAN ASSISTANT

## 2019-03-12 PROCEDURE — 99999 PR PBB SHADOW E&M-EST. PATIENT-LVL III: ICD-10-PCS | Mod: PBBFAC,,, | Performed by: PHYSICIAN ASSISTANT

## 2019-03-12 PROCEDURE — 63600175 PHARM REV CODE 636 W HCPCS: Mod: JG | Performed by: PHYSICIAN ASSISTANT

## 2019-03-12 PROCEDURE — 96372 THER/PROPH/DIAG INJ SC/IM: CPT

## 2019-03-12 RX ADMIN — DENOSUMAB 60 MG: 60 INJECTION SUBCUTANEOUS at 10:03

## 2019-03-12 NOTE — NURSING
Prolia 60 mg q 6 months  Last dose given-9/10/18    Any invasive dental procedures in past 3 months or upcoming 3 months: denies    Last Rheumatology provider visit- Seen by NEVA Rodriguez on 3/12/19    Recent labs? 3/8/19;  CKD pt needing repeat labs in 10 days- No   Lab Results   Component Value Date    CALCIUM 10.3 03/08/2019     Lab Results   Component Value Date    CREATININE 1.1 03/08/2019     Lab Results   Component Value Date    ESTGFRAFRICA 52.5 (A) 03/08/2019     Lab Results   Component Value Date    EGFRNONAA 45.6 (A) 03/08/2019     Lab Results   Component Value Date    ZCHADWOR98XV 30 03/08/2019          Lot #- [id number]  Expiration Date- 06/21      Prolia 60 mg/ml administered SQ to Right lower abdominal quadrant. Tolerated without any complaints. No adverse reaction noted or reported after 15 minutes of administration. No redness, swelling, or drainage noted to site. Pt instructed on signs and symptoms of reaction to report. Verbalizes understanding.

## 2019-03-12 NOTE — PROGRESS NOTES
"Subjective:       Patient ID: Esperanza Rubalcava is a 86 y.o. female.    Chief Complaint: osteoporosis    Esperanza is here for follow up for OP.  She has a h/o Right distal radius rx (about 2 yr ago).  She took fosamax many years ago as well as high dose vit d.  Vit D returned to normal and actually was high so all replacement was stopped.  Last level in 3/2018 was 33, she has remained off vit D supplement.  No extra Ca supplement or MV is being taken either. She has had 5 doses of prolia so far, last done 9/10/18. NO issues tolerating prolia.   She uses a walker to ambulate at time. No falls/fxs. No complaints today pain level rated 0/10    No fevers or infections, no dysuria or flank pain. No recent or upcoming dental procedures.       Repeat DEXA  9/10/18 stable still osteoporosis       Review of Systems   Constitutional: Negative for chills, fatigue and fever.   HENT: Negative for mouth sores, rhinorrhea and sore throat.    Eyes: Negative for pain and redness.   Respiratory: Negative for cough and shortness of breath.    Cardiovascular: Negative for chest pain.   Gastrointestinal: Negative for abdominal pain, constipation, diarrhea, nausea and vomiting.   Genitourinary: Negative for dysuria and hematuria.   Musculoskeletal: Negative for arthralgias, gait problem, joint swelling and myalgias.   Skin: Negative for rash.   Neurological: Negative for weakness, numbness and headaches.   Psychiatric/Behavioral: The patient is not nervous/anxious.          Objective:   BP (!) 159/86   Pulse 87   Ht 5' 1" (1.549 m)   Wt 57.5 kg (126 lb 12.2 oz)   BMI 23.95 kg/m²      Physical Exam   Constitutional: She is oriented to person, place, and time and well-developed, well-nourished, and in no distress.   HENT:   Head: Normocephalic and atraumatic.   Eyes: Pupils are equal, round, and reactive to light. Right eye exhibits no discharge.   Neck: Normal range of motion.   Cardiovascular: Normal rate, regular rhythm and normal " heart sounds.  Exam reveals no friction rub.    Pulmonary/Chest: Effort normal and breath sounds normal. No respiratory distress.   Abdominal: Soft. She exhibits no distension. There is no tenderness.   Lymphadenopathy:     She has no cervical adenopathy.   Neurological: She is alert and oriented to person, place, and time.   Skin: No rash noted. No erythema.     Psychiatric: Mood normal.   Musculoskeletal: She exhibits deformity. She exhibits no edema.   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 with bony enlargements to her pip and dips  No synovitis   Scoliosis mid back with left sided curve  No kyphosis   No ttp along her spine                Recent Results (from the past 168 hour(s))   Vitamin D    Collection Time: 03/08/19 10:33 AM   Result Value Ref Range    Vit D, 25-Hydroxy 30 30 - 96 ng/mL   Comprehensive metabolic panel    Collection Time: 03/08/19 10:33 AM   Result Value Ref Range    Sodium 139 136 - 145 mmol/L    Potassium 4.3 3.5 - 5.1 mmol/L    Chloride 104 95 - 110 mmol/L    CO2 26 23 - 29 mmol/L    Glucose 122 (H) 70 - 110 mg/dL    BUN, Bld 12 8 - 23 mg/dL    Creatinine 1.1 0.5 - 1.4 mg/dL    Calcium 10.3 8.7 - 10.5 mg/dL    Total Protein 7.2 6.0 - 8.4 g/dL    Albumin 3.7 3.5 - 5.2 g/dL    Total Bilirubin 0.5 0.1 - 1.0 mg/dL    Alkaline Phosphatase 77 55 - 135 U/L    AST 19 10 - 40 U/L    ALT 10 10 - 44 U/L    Anion Gap 9 8 - 16 mmol/L    eGFR if African American 52.5 (A) >60 mL/min/1.73 m^2    eGFR if non African American 45.6 (A) >60 mL/min/1.73 m^2        DEXA  9/10/18  Total Femur Mean BMD 0.654 g/cm2 with T score -2.8  Femur neck Mean BMD 0.601 g/cm2 with T score -3.1  Spine L3-L4 BMD 1.028 g/cm2 with T score -1.3  Stable at total femur, slight fall at F neck, improved at spine  Still OP       DEXA  1/25/16  Total Femur Mean BMD 0.660 g/cm2 with T score -2.8  Femur neck Mean BMD 0.620 g/cm2 with T score -3.0  Spine L3-L4 BMD 1.012 g/cm2 with T score -1.6  OP      Assessment:       1. Age-related osteoporosis  without current pathological fracture    2. Vitamin D deficiency    3. Medication monitoring encounter    4. CKD (chronic kidney disease) stage 3, GFR 30-59 ml/min    5. Risk for falls        Impression:  Osteoporosis: h/o fosamax, h/o right wrist fx 1.5 year ago, now on prolia (x 4 doses) q 6mos; no recent falls/fxs  Repeat dexa 9/10/18- slight fall femur neck mean,stable at total femur,  impromved spine- still OP  Will c/w Prolia     Medication Monitoring: no issue with prolia, ca normal, gfr 46, will watch     Plan:         Labs reviewed and done within past 14 days  Ca 10.3, Creat 1.1, eGFR 46  No contraindication for Prolia today, ok for nurse to administer today  Re-evaluate patient again in 6 months to determine if Prolia still medically indicated and appropriate to administer.    KDIGO lab monitoring will be followed according to KDIGO 2017 Clinical Practice Guideline Update for the Diagnosis, Evaluation, Prevention, and Treatment of Chronic Kidney Disease-Mineral and Bone Disorder (CKD-MBD)  Chapter 3.1: Diagnosis of CKD-MBD: biochemical abnormalities    Ok to stay off supplemental vit D for now, get some sunshine in the spring, summer and fall  Check D level yearly     Repeat dexa in 2-3 year if falling bmd consider forteo/tymlos if appropriate, christy if any fx occur    Chew 1 tums daily (1000 mg CA per tab)  x 2 weeks  Hydrate       rtc in 6 months with cmp, vit d and  Prolia

## 2019-03-21 NOTE — TELEPHONE ENCOUNTER
----- Message from James Tejada sent at 1/10/2017  1:51 PM CST -----  Thania, daughter, is returning a missed call from the nurse./ States she can be reached at [phone]  
Called pt left message to call back.  
Called pt to schedule np appt. Left message to call back.  
returned pt daughter Thania call. Scheduled np appt for 02/21/17@2:00. Pt daughter verbalized understanding.  
9

## 2019-03-26 NOTE — PROGRESS NOTES
"Subjective:      Patient ID: Esperanza Rubalcava is a 86 y.o. female.    Chief Complaint: Follow-up      HPI  Here for f/u medical problems and preventive exam.  Has a cat that bites frequently.  Energy good.  No f/c/sw/cough.  No cp/sob/palp.  BMs normal.  Urine normal.  On prolia.    HM: 10/18 fluvax, 1/16 [id number], 1/17 booster [id number], 9/10 TD says had tet in 12/18, 1/11 zostervax, 9/18 BMD on prolia, Cscope in past normal.      Review of Systems   Constitutional: Negative for appetite change, chills, diaphoresis and fever.   HENT: Negative for congestion, ear pain, rhinorrhea, sinus pressure and sore throat.    Respiratory: Negative for cough, chest tightness and shortness of breath.    Cardiovascular: Negative for chest pain and palpitations.   Gastrointestinal: Negative for blood in stool, constipation, diarrhea, nausea and vomiting.   Genitourinary: Negative for dysuria, frequency, hematuria, menstrual problem, urgency and vaginal discharge.   Musculoskeletal: Negative for arthralgias.   Skin: Negative for rash.   Neurological: Negative for dizziness and headaches.   Psychiatric/Behavioral: Negative for sleep disturbance. The patient is not nervous/anxious.          Objective:   /82 (BP Location: Left arm, Patient Position: Sitting, BP Method: Medium (Manual))   Pulse 84   Temp 96.8 °F (36 °C) (Tympanic)   Ht 5' 1" (1.549 m)   Wt 57.8 kg (127 lb 6.8 oz)   SpO2 96%   BMI 24.08 kg/m²     Physical Exam   Constitutional: She is oriented to person, place, and time. She appears well-developed and well-nourished.   HENT:   Right Ear: External ear normal. Tympanic membrane is not injected.   Left Ear: External ear normal. Tympanic membrane is not injected.   Mouth/Throat: Oropharynx is clear and moist.   Eyes: Conjunctivae are normal.   Neck: Normal range of motion. Neck supple. No thyromegaly present.   Cardiovascular: Normal rate, regular rhythm and intact distal pulses. Exam reveals no gallop and no " friction rub.   No murmur heard.  Pulmonary/Chest: Effort normal and breath sounds normal. She has no wheezes. She has no rales.   Abdominal: Soft. Bowel sounds are normal. She exhibits no mass. There is no tenderness.   Musculoskeletal: She exhibits no edema.   Lymphadenopathy:     She has no cervical adenopathy.   Neurological: She is alert and oriented to person, place, and time.   Skin: Skin is warm. No rash noted.   Psychiatric: She has a normal mood and affect.       Results for orders placed or performed in visit on 03/08/19   Vitamin D   Result Value Ref Range    Vit D, 25-Hydroxy 30 30 - 96 ng/mL   Comprehensive metabolic panel   Result Value Ref Range    Sodium 139 136 - 145 mmol/L    Potassium 4.3 3.5 - 5.1 mmol/L    Chloride 104 95 - 110 mmol/L    CO2 26 23 - 29 mmol/L    Glucose 122 (H) 70 - 110 mg/dL    BUN, Bld 12 8 - 23 mg/dL    Creatinine 1.1 0.5 - 1.4 mg/dL    Calcium 10.3 8.7 - 10.5 mg/dL    Total Protein 7.2 6.0 - 8.4 g/dL    Albumin 3.7 3.5 - 5.2 g/dL    Total Bilirubin 0.5 0.1 - 1.0 mg/dL    Alkaline Phosphatase 77 55 - 135 U/L    AST 19 10 - 40 U/L    ALT 10 10 - 44 U/L    Anion Gap 9 8 - 16 mmol/L    eGFR if African American 52.5 (A) >60 mL/min/1.73 m^2    eGFR if non African American 45.6 (A) >60 mL/min/1.73 m^2         Assessment:       1. Encounter for preventive health examination    2. Vitamin D deficiency    3. Leiomyosarcoma of left thigh    4. CKD (chronic kidney disease) stage 3, GFR 30-59 ml/min    5. Age-related osteoporosis without current pathological fracture          Plan:     Encounter for preventive health examination  -     Cancel: Tdap Vaccine    Vitamin D deficiency- ok with diet only.    Leiomyosarcoma of left thigh    CKD (chronic kidney disease) stage 3, GFR 30-59 ml/min- stable.    Age-related osteoporosis without current pathological fracture- on prolia.    Other orders- for prn use-  -     amoxicillin-clavulanate 875-125mg (AUGMENTIN) 875-125 mg per tablet; Take 1  tablet by mouth 2 (two) times daily. for 10 days  Dispense: 20 tablet; Refill: 0    RTC 6 mo.

## 2019-04-09 ENCOUNTER — OFFICE VISIT (OUTPATIENT)
Dept: INTERNAL MEDICINE | Facility: CLINIC | Age: 84
End: 2019-04-09
Payer: MEDICARE

## 2019-04-09 VITALS
WEIGHT: 127.44 LBS | SYSTOLIC BLOOD PRESSURE: 126 MMHG | TEMPERATURE: 97 F | BODY MASS INDEX: 24.06 KG/M2 | HEIGHT: 61 IN | HEART RATE: 84 BPM | OXYGEN SATURATION: 96 % | DIASTOLIC BLOOD PRESSURE: 82 MMHG

## 2019-04-09 DIAGNOSIS — N18.30 CKD (CHRONIC KIDNEY DISEASE) STAGE 3, GFR 30-59 ML/MIN: ICD-10-CM

## 2019-04-09 DIAGNOSIS — M81.0 AGE-RELATED OSTEOPOROSIS WITHOUT CURRENT PATHOLOGICAL FRACTURE: ICD-10-CM

## 2019-04-09 DIAGNOSIS — E55.9 VITAMIN D DEFICIENCY: ICD-10-CM

## 2019-04-09 DIAGNOSIS — Z00.00 ENCOUNTER FOR PREVENTIVE HEALTH EXAMINATION: Primary | ICD-10-CM

## 2019-04-09 DIAGNOSIS — C49.22 LEIOMYOSARCOMA OF LEFT THIGH: ICD-10-CM

## 2019-04-09 PROCEDURE — 99999 PR PBB SHADOW E&M-EST. PATIENT-LVL III: ICD-10-PCS | Mod: PBBFAC,,, | Performed by: INTERNAL MEDICINE

## 2019-04-09 PROCEDURE — 99214 OFFICE O/P EST MOD 30 MIN: CPT | Mod: S$GLB,,, | Performed by: INTERNAL MEDICINE

## 2019-04-09 PROCEDURE — 99214 PR OFFICE/OUTPT VISIT, EST, LEVL IV, 30-39 MIN: ICD-10-PCS | Mod: S$GLB,,, | Performed by: INTERNAL MEDICINE

## 2019-04-09 PROCEDURE — 99999 PR PBB SHADOW E&M-EST. PATIENT-LVL III: CPT | Mod: PBBFAC,,, | Performed by: INTERNAL MEDICINE

## 2019-04-09 RX ORDER — AMOXICILLIN AND CLAVULANATE POTASSIUM 875; 125 MG/1; MG/1
1 TABLET, FILM COATED ORAL 2 TIMES DAILY
Qty: 20 TABLET | Refills: 0 | Status: SHIPPED | OUTPATIENT
Start: 2019-04-09 | End: 2019-04-19

## 2019-09-12 ENCOUNTER — PATIENT MESSAGE (OUTPATIENT)
Dept: FAMILY MEDICINE | Facility: CLINIC | Age: 84
End: 2019-09-12

## 2019-09-13 ENCOUNTER — OFFICE VISIT (OUTPATIENT)
Dept: FAMILY MEDICINE | Facility: CLINIC | Age: 84
End: 2019-09-13
Payer: MEDICARE

## 2019-09-13 ENCOUNTER — HOSPITAL ENCOUNTER (OUTPATIENT)
Dept: RADIOLOGY | Facility: HOSPITAL | Age: 84
Discharge: HOME | End: 2019-09-13
Attending: FAMILY MEDICINE
Payer: MEDICARE

## 2019-09-13 VITALS
HEART RATE: 100 BPM | BODY MASS INDEX: 23.91 KG/M2 | RESPIRATION RATE: 16 BRPM | SYSTOLIC BLOOD PRESSURE: 138 MMHG | HEIGHT: 61 IN | OXYGEN SATURATION: 96 % | WEIGHT: 126.63 LBS | DIASTOLIC BLOOD PRESSURE: 72 MMHG | TEMPERATURE: 98 F

## 2019-09-13 DIAGNOSIS — N18.30 CKD (CHRONIC KIDNEY DISEASE) STAGE 3, GFR 30-59 ML/MIN: ICD-10-CM

## 2019-09-13 DIAGNOSIS — M25.552 LEFT HIP PAIN: ICD-10-CM

## 2019-09-13 DIAGNOSIS — M41.80 LEVOSCOLIOSIS: ICD-10-CM

## 2019-09-13 DIAGNOSIS — C49.22 LEIOMYOSARCOMA OF LEFT THIGH: ICD-10-CM

## 2019-09-13 DIAGNOSIS — M54.50 LOW BACK PAIN, NON-SPECIFIC: ICD-10-CM

## 2019-09-13 DIAGNOSIS — M51.36 DDD (DEGENERATIVE DISC DISEASE), LUMBAR: Primary | ICD-10-CM

## 2019-09-13 DIAGNOSIS — M81.0 AGE-RELATED OSTEOPOROSIS WITHOUT CURRENT PATHOLOGICAL FRACTURE: ICD-10-CM

## 2019-09-13 PROCEDURE — 99214 OFFICE O/P EST MOD 30 MIN: CPT | Mod: S$GLB,,, | Performed by: FAMILY MEDICINE

## 2019-09-13 PROCEDURE — 99214 PR OFFICE/OUTPT VISIT, EST, LEVL IV, 30-39 MIN: ICD-10-PCS | Mod: S$GLB,,, | Performed by: FAMILY MEDICINE

## 2019-09-13 PROCEDURE — 73502 XR HIP 2 VIEW LEFT: ICD-10-PCS | Mod: 26,LT,, | Performed by: RADIOLOGY

## 2019-09-13 PROCEDURE — 99999 PR PBB SHADOW E&M-EST. PATIENT-LVL IV: ICD-10-PCS | Mod: PBBFAC,,, | Performed by: FAMILY MEDICINE

## 2019-09-13 PROCEDURE — 73502 X-RAY EXAM HIP UNI 2-3 VIEWS: CPT | Mod: 26,LT,, | Performed by: RADIOLOGY

## 2019-09-13 PROCEDURE — 72110 XR LUMBAR SPINE COMPLETE 5 VIEW: ICD-10-PCS | Mod: 26,,, | Performed by: RADIOLOGY

## 2019-09-13 PROCEDURE — 99999 PR PBB SHADOW E&M-EST. PATIENT-LVL IV: CPT | Mod: PBBFAC,,, | Performed by: FAMILY MEDICINE

## 2019-09-13 PROCEDURE — 72110 X-RAY EXAM L-2 SPINE 4/>VWS: CPT | Mod: 26,,, | Performed by: RADIOLOGY

## 2019-09-13 PROCEDURE — 73502 X-RAY EXAM HIP UNI 2-3 VIEWS: CPT | Mod: TC,PO,LT

## 2019-09-13 PROCEDURE — 1101F PR PT FALLS ASSESS DOC 0-1 FALLS W/OUT INJ PAST YR: ICD-10-PCS | Mod: CPTII,S$GLB,, | Performed by: FAMILY MEDICINE

## 2019-09-13 PROCEDURE — 1101F PT FALLS ASSESS-DOCD LE1/YR: CPT | Mod: CPTII,S$GLB,, | Performed by: FAMILY MEDICINE

## 2019-09-13 PROCEDURE — 72110 X-RAY EXAM L-2 SPINE 4/>VWS: CPT | Mod: TC,PO

## 2019-09-13 NOTE — PROGRESS NOTES
Subjective:       Patient ID: Esperanza Rubalcava is a 86 y.o. female.    Chief Complaint: Leg Pain    HPI   Leg Pain  85yo female presents today accompanied by her daughter with report of left sided low back pain radiating along the lateral thigh present for the past week. Patient reports symptoms began after she had been squatting down while doing chores at home for prolonged duration. She notes pain occurred when she raised to standing position. She has been using a walker to help with her activity level. She describes feeling more unsteady than her usual baseline due to pain. She has not fallen. Pain is rated at 5/10. Not currently taking any measures for pain relief. There has been no change in bowel or bladder function. She has underlying osteoporosis and will be seeing Rheumatology for infusion next week. Her daughter expresses concern about the patient's pain in light of an upcoming beach vacation.    Past Medical History:   Diagnosis Date    Leiomyosarcoma of left thigh     wide excision was all treatment.    Levoscoliosis     Thoracolumbar junction    OP (osteoporosis)      Past Surgical History:   Procedure Laterality Date    APPENDECTOMY      HYSTERECTOMY      LEG SURGERY      Possible sarcoma     History reviewed. No pertinent family history.    Review of Systems   Constitutional: Positive for activity change. Negative for appetite change and fatigue.   Eyes: Negative for visual disturbance.   Respiratory: Negative for cough and shortness of breath.    Cardiovascular: Negative for chest pain, palpitations and leg swelling.   Gastrointestinal: Negative for abdominal pain, constipation and diarrhea.   Genitourinary: Negative for decreased urine volume and difficulty urinating.   Musculoskeletal: Positive for arthralgias and gait problem. Negative for joint swelling.   Neurological: Negative for speech difficulty, weakness, light-headedness, numbness and headaches.   Psychiatric/Behavioral: Negative  "for sleep disturbance. The patient is not nervous/anxious.        Objective:   /72   Pulse 100   Temp 98 °F (36.7 °C) (Temporal)   Resp 16   Ht 5' 1" (1.549 m)   Wt 57.5 kg (126 lb 10.5 oz)   SpO2 96%   BMI 23.93 kg/m²   Physical Exam   Constitutional: She is oriented to person, place, and time. She appears well-developed and well-nourished. No distress.   HENT:   Head: Normocephalic and atraumatic.   Mouth/Throat: Oropharynx is clear and moist.   Eyes: Pupils are equal, round, and reactive to light. Conjunctivae and EOM are normal.   Neck: Normal range of motion. Neck supple.   Cardiovascular: Normal rate, regular rhythm and normal heart sounds.   Pulmonary/Chest: Effort normal and breath sounds normal.   Abdominal: Soft. Bowel sounds are normal.   Musculoskeletal: She exhibits no edema.        Left hip: She exhibits normal range of motion, normal strength, no tenderness and no bony tenderness.        Left knee: She exhibits normal range of motion. No tenderness found.        Lumbar back: She exhibits normal range of motion, no tenderness and no bony tenderness.        Left upper leg: She exhibits no tenderness, no bony tenderness, no swelling and no edema.   Scoliosis    Neurological: She is alert and oriented to person, place, and time.   Skin: Skin is warm and dry. She is not diaphoretic.   Psychiatric: She has a normal mood and affect.       Assessment:       1. DDD (degenerative disc disease), lumbar    2. Low back pain, non-specific    3. Levoscoliosis    4. Left hip pain    5. Age-related osteoporosis without current pathological fracture    6. Leiomyosarcoma of left thigh    7. CKD (chronic kidney disease) stage 3, GFR 30-59 ml/min        Plan:      DDD (degenerative disc disease), lumbar  Noted on imaging. No acute fracture. Possible compression deformities that appear chronic in nature. ?sciatica versus low back strain? Recommend heat alternating with ice. Tylenol arthritis q 8 hours for pain " relief. Given co-morbidities and daughter's concern with ambulation will ask Pain Management to further eval. She may benefit for PT for strengthening as well. Fall prevention is advised. She is encouraged to continue to utilize a walker with ambulation and to transition slowly between positions. See PCP for routine care.     Low back pain, non-specific  -     X-Ray Lumbar Spine Complete 5 View; Future; Expected date: 09/13/2019  -     Ambulatory Referral to Pain Clinic    Levoscoliosis  Noted on imaging as above.    Left hip pain  -     X-Ray Hip 2 View Left; Future; Expected date: 09/13/2019  -     Ambulatory Referral to Pain Clinic    Age-related osteoporosis without current pathological fracture  As per Rheumatology.  -     Ambulatory Referral to Pain Clinic    Leiomyosarcoma of left thigh    CKD (chronic kidney disease) stage 3, GFR 30-59 ml/min  Avoidance of NSAID use remains advised. Proceed as per #1.

## 2019-09-16 ENCOUNTER — INFUSION (OUTPATIENT)
Dept: RHEUMATOLOGY | Facility: HOSPITAL | Age: 84
End: 2019-09-16
Attending: INTERNAL MEDICINE
Payer: MEDICARE

## 2019-09-16 ENCOUNTER — OFFICE VISIT (OUTPATIENT)
Dept: RHEUMATOLOGY | Facility: CLINIC | Age: 84
End: 2019-09-16
Payer: MEDICARE

## 2019-09-16 VITALS
HEART RATE: 93 BPM | HEIGHT: 61 IN | WEIGHT: 126.13 LBS | BODY MASS INDEX: 23.81 KG/M2 | DIASTOLIC BLOOD PRESSURE: 80 MMHG | SYSTOLIC BLOOD PRESSURE: 161 MMHG

## 2019-09-16 DIAGNOSIS — M41.80 LEVOSCOLIOSIS: Chronic | ICD-10-CM

## 2019-09-16 DIAGNOSIS — E55.9 VITAMIN D DEFICIENCY: ICD-10-CM

## 2019-09-16 DIAGNOSIS — Z51.81 MEDICATION MONITORING ENCOUNTER: ICD-10-CM

## 2019-09-16 DIAGNOSIS — M25.552 HIP PAIN, ACUTE, LEFT: Chronic | ICD-10-CM

## 2019-09-16 DIAGNOSIS — M81.0 AGE-RELATED OSTEOPOROSIS WITHOUT CURRENT PATHOLOGICAL FRACTURE: Primary | ICD-10-CM

## 2019-09-16 PROCEDURE — 99214 PR OFFICE/OUTPT VISIT, EST, LEVL IV, 30-39 MIN: ICD-10-PCS | Mod: S$GLB,,, | Performed by: INTERNAL MEDICINE

## 2019-09-16 PROCEDURE — 96372 THER/PROPH/DIAG INJ SC/IM: CPT

## 2019-09-16 PROCEDURE — 99999 PR PBB SHADOW E&M-EST. PATIENT-LVL III: CPT | Mod: PBBFAC,,, | Performed by: INTERNAL MEDICINE

## 2019-09-16 PROCEDURE — 1101F PR PT FALLS ASSESS DOC 0-1 FALLS W/OUT INJ PAST YR: ICD-10-PCS | Mod: CPTII,S$GLB,, | Performed by: INTERNAL MEDICINE

## 2019-09-16 PROCEDURE — 63600175 PHARM REV CODE 636 W HCPCS: Mod: JG | Performed by: PHYSICIAN ASSISTANT

## 2019-09-16 PROCEDURE — 99214 OFFICE O/P EST MOD 30 MIN: CPT | Mod: S$GLB,,, | Performed by: INTERNAL MEDICINE

## 2019-09-16 PROCEDURE — 99999 PR PBB SHADOW E&M-EST. PATIENT-LVL III: ICD-10-PCS | Mod: PBBFAC,,, | Performed by: INTERNAL MEDICINE

## 2019-09-16 PROCEDURE — 1101F PT FALLS ASSESS-DOCD LE1/YR: CPT | Mod: CPTII,S$GLB,, | Performed by: INTERNAL MEDICINE

## 2019-09-16 RX ADMIN — DENOSUMAB 60 MG: 60 INJECTION SUBCUTANEOUS at 10:09

## 2019-09-16 NOTE — Clinical Note
Please call her and tell her her D level was low again and to start vitamin D3 a 1000 units every day-thanks

## 2019-09-16 NOTE — PATIENT INSTRUCTIONS
Denosumab injection  What is this medicine?  DENOSUMAB (den oh nicho mab) slows bone breakdown. Prolia is used to treat osteoporosis in women after menopause and in men. Xgeva is used to prevent bone fractures and other bone problems caused by cancer bone metastases. Xgeva is also used to treat giant cell tumor of the bone.  How should I use this medicine?  This medicine is for injection under the skin. It is given by a health care professional in a hospital or clinic setting.  If you are getting Prolia, a special MedGuide will be given to you by the pharmacist with each prescription and refill. Be sure to read this information carefully each time.  For Prolia, talk to your pediatrician regarding the use of this medicine in children. Special care may be needed. For Xgeva, talk to your pediatrician regarding the use of this medicine in children. While this drug may be prescribed for children as young as 13 years for selected conditions, precautions do apply.  What side effects may I notice from receiving this medicine?  Side effects that you should report to your doctor or health care professional as soon as possible:  · allergic reactions like skin rash, itching or hives, swelling of the face, lips, or tongue  · breathing problems  · chest pain  · fast, irregular heartbeat  · feeling faint or lightheaded, falls  · fever, chills, or any other sign of infection  · muscle spasms, tightening, or twitches  · numbness or tingling  · skin blisters or bumps, or is dry, peels, or red  · slow healing or unexplained pain in the mouth or jaw  · unusual bleeding or bruising  Side effects that usually do not require medical attention (Report these to your doctor or health care professional if they continue or are bothersome.):  · muscle pain  · stomach upset, gas  What may interact with this medicine?  Do not take this medicine with any of the following medications:  · other medicines containing denosumab  This medicine may also  interact with the following medications:  · medicines that suppress the immune system  · medicines that treat cancer  · steroid medicines like prednisone or cortisone  What if I miss a dose?  It is important not to miss your dose. Call your doctor or health care professional if you are unable to keep an appointment.  Where should I keep my medicine?  This medicine is only given in a clinic, doctor's office, or other health care setting and will not be stored at home.  What should I tell my health care provider before I take this medicine?  They need to know if you have any of these conditions:  · dental disease  · eczema  · infection or history of infections  · kidney disease or on dialysis  · low blood calcium or vitamin D  · malabsorption syndrome  · scheduled to have surgery or tooth extraction  · taking medicine that contains denosumab  · thyroid or parathyroid disease  · an unusual reaction to denosumab, other medicines, foods, dyes, or preservatives  · pregnant or trying to get pregnant  · breast-feeding  What should I watch for while using this medicine?  Visit your doctor or health care professional for regular checks on your progress. Your doctor or health care professional may order blood tests and other tests to see how you are doing.  Call your doctor or health care professional if you get a cold or other infection while receiving this medicine. Do not treat yourself. This medicine may decrease your body's ability to fight infection.  You should make sure you get enough calcium and vitamin D while you are taking this medicine, unless your doctor tells you not to. Discuss the foods you eat and the vitamins you take with your health care professional.  See your dentist regularly. Brush and floss your teeth as directed. Before you have any dental work done, tell your dentist you are receiving this medicine.  Do not become pregnant while taking this medicine or for 5 months after stopping it. Women should  inform their doctor if they wish to become pregnant or think they might be pregnant. There is a potential for serious side effects to an unborn child. Talk to your health care professional or pharmacist for more information.  NOTE:This sheet is a summary. It may not cover all possible information. If you have questions about this medicine, talk to your doctor, pharmacist, or health care provider. Copyright© 2017 [employer]

## 2019-09-16 NOTE — PATIENT INSTRUCTIONS
Ok for prolia today    Ok to take Tylenol Arthritis - 2 every 8 hrs    Do not take over the counter meds for arthritis pain other than tylenol type- ie- no Advil, Motrin, Aleve, naproxen or high dose asprin    Ok for heating pad    Put off visit to Pain management for 7-10 days after shot today

## 2019-09-16 NOTE — NURSING
Lab Results   Component Value Date    CALCIUM 10.0 09/13/2019     Lab Results   Component Value Date    CREATININE 1.1 09/13/2019     Lab Results   Component Value Date    ESTGFRAFRICA 52.5 (A) 09/13/2019     Lab Results   Component Value Date    EGFRNONAA 45.6 (A) 09/13/2019     Lab Results   Component Value Date    COPWGUKN79YY 24 (L) 09/13/2019       Printed information regarding Prolia given to pt. Instructed on s/s to report. Verbalized understanding.  Administered Prolia 60 mg/ml SQ in abd  Instructed to wait in clinic x 15 min. For monitoring.  Pt tolerated well without adverse event.  Discharged ambulatory from clinic.

## 2019-09-18 ENCOUNTER — PATIENT MESSAGE (OUTPATIENT)
Dept: FAMILY MEDICINE | Facility: CLINIC | Age: 84
End: 2019-09-18

## 2019-09-23 NOTE — PROGRESS NOTES
Subjective:       Patient ID: Esperanza Rubalcava is a 86 y.o. female.    Chief Complaint: osteoporosis-disease management    Esperanza is here for follow up for OP.  She was last seen 6 months ago and given Prolia 60 mg with no complications.  She has had no reactions to this medication.  She has not taken vitamin-D presently.  She has a h/o Right distal radius rx (about 2 yr ago).  She took fosamax many years ago as well as high dose vit d.  Vit D returned to normal and actually was high so all replacement was stopped.  Last level in 3/2019 was 30, she has remained off vit D supplement.  D level repeated several days ago No extra Ca supplement or MV is being taken either. She has had 6 doses of prolia so far, last done 03/12/2019.   She uses a walker to ambulate at time. No falls/fxs.  She bent down several weeks ago and got sore in the left buttocks.  She was seen by primary care.  X-ray was done and was negative for any fracture.  She has been taking Tylenol Arthritis Strength.  She is going to be seeing pain management in the next week or so.    No fevers or infections, no dysuria or flank pain. No recent or upcoming dental procedures.     Repeat DEXA  9/10/18 stable still [id number] -3.1 noted     Review of Systems   Constitutional: Negative for chills, fatigue and fever.   HENT: Negative for mouth sores, rhinorrhea and sore throat.    Eyes: Negative for pain and redness.   Respiratory: Negative for cough and shortness of breath.    Cardiovascular: Negative for chest pain.   Gastrointestinal: Negative for abdominal pain, constipation, diarrhea, nausea and vomiting.   Genitourinary: Negative for dysuria and hematuria.   Musculoskeletal: Positive for back pain. Negative for arthralgias, gait problem, joint swelling and myalgias.        Left buttock pain recently-see HPI   Skin: Negative for rash.   Allergic/Immunologic: Negative for immunocompromised state.   Neurological: Negative for dizziness,  "weakness, numbness and headaches.   Psychiatric/Behavioral: Negative for agitation. The patient is not nervous/anxious.        Past Medical History:   Diagnosis Date    Leiomyosarcoma of left thigh     wide excision was all treatment.    Levoscoliosis     Thoracolumbar junction    OP (osteoporosis)      Past Surgical History:   Procedure Laterality Date    APPENDECTOMY      HYSTERECTOMY      LEG SURGERY      Possible sarcoma     History reviewed. No pertinent family history.  Social History     Socioeconomic History    Marital status:      Spouse name: Not on file    Number of children: 3    Years of education: Not on file    Highest education level: Not on file   Occupational History    Occupation: Office work   Social Needs    Financial resource strain: Not on file    Food insecurity:     Worry: Not on file     Inability: Not on file    Transportation needs:     Medical: Not on file     Non-medical: Not on file   Tobacco Use    Smoking status: Never Smoker    Smokeless tobacco: Never Used   Substance and Sexual Activity    Alcohol use: No    Drug use: No    Sexual activity: Never   Lifestyle    Physical activity:     Days per week: Not on file     Minutes per session: Not on file    Stress: Not on file   Relationships    Social connections:     Talks on phone: Not on file     Gets together: Not on file     Attends Hoahaoism service: Not on file     Active member of club or organization: Not on file     Attends meetings of clubs or organizations: Not on file     Relationship status: Not on file   Other Topics Concern    Not on file   Social History Narrative    Not on file     Review of patient's allergies indicates:  No Known Allergies  Objective:   BP (!) 161/80   Pulse 93   Ht 5' 1" (1.549 m)   Wt 57.2 kg (126 lb 1.7 oz)   BMI 23.83 kg/m²      Physical Exam   Constitutional: She is oriented to person, place, and time and well-developed, well-nourished, and in no distress. "   HENT:   Head: Normocephalic and atraumatic.   Eyes: Pupils are equal, round, and reactive to light. Right eye exhibits no discharge.   Neck: Normal range of motion.   Cardiovascular: Normal rate and regular rhythm.  Exam reveals no gallop.    No murmur heard.  Pulmonary/Chest: Effort normal and breath sounds normal. No respiratory distress. She has no wheezes. She has no rales.   Abdominal: Soft. She exhibits no distension. There is no tenderness.   Lymphadenopathy:     She has no cervical adenopathy.   Neurological: She is alert and oriented to person, place, and time.   Skin: No rash noted. No erythema.     Psychiatric: Mood and affect normal.   Musculoskeletal: She exhibits deformity. She exhibits no edema.   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  Hip motion is normal negative straight leg raise               Results for orders placed or performed in visit on 09/13/19   Vitamin D   Result Value Ref Range    Vit D, 25-Hydroxy 24 (L) 30 - 96 ng/mL   Comprehensive metabolic panel   Result Value Ref Range    Sodium 139 136 - 145 mmol/L    Potassium 4.0 3.5 - 5.1 mmol/L    Chloride 103 95 - 110 mmol/L    CO2 25 23 - 29 mmol/L    Glucose 126 (H) 70 - 110 mg/dL    BUN, Bld 13 8 - 23 mg/dL    Creatinine 1.1 0.5 - 1.4 mg/dL    Calcium 10.0 8.7 - 10.5 mg/dL    Total Protein 7.3 6.0 - 8.4 g/dL    Albumin 3.8 3.5 - 5.2 g/dL    Total Bilirubin 0.4 0.1 - 1.0 mg/dL    Alkaline Phosphatase 92 55 - 135 U/L    AST 18 10 - 40 U/L    ALT 7 (L) 10 - 44 U/L    Anion Gap 11 8 - 16 mmol/L    eGFR if African American 52.5 (A) >60 mL/min/1.73 m^2    eGFR if non African American 45.6 (A) >60 mL/min/1.73 m^2       DEXA  9/10/18  Total Femur Mean BMD 0.654 g/cm2 with T score -2.8  Femur neck Mean BMD 0.601 g/cm2 with T score -3.1  Spine L3-L4 BMD 1.028 g/cm2  with T score -1.3  Stable at total femur, slight fall at F neck, improved at spine  Still OP       DEXA  1/25/16  Total Femur Mean BMD 0.660 g/cm2 with T score -2.8  Femur neck Mean BMD 0.620 g/cm2 with T score -3.0  Spine L3-L4 BMD 1.012 g/cm2 with T score -1.6  OP      Assessment:       1. Age-related osteoporosis without current pathological fracture-high risk of fracture with T-score worse than -3 -presently on Prolia with no contraindications   2. Levoscoliosis -    3. Hip pain, acute, left -muscle strain likely with no evidence of fracture by x-ray-seeing primary care   4. Vitamin D deficiency -D level 24 --3 days ago   5.  6 Medication monitoring encounter -no contraindications to Prolia  Chronic kidney disease-creatinine clearance 46       Impression:      Plan:          Ok for prolia today    Ok to take Tylenol Arthritis - 2 every 8 hrs    Do not take over the counter meds for arthritis pain other than tylenol type- ie- no Advil, Motrin, Aleve, naproxen or high dose asprin    Ok for heating pad times 20 30 min several times daily to left hip    Put off visit to Pain management for 7-10 days after shot today    Labs reviewed and done within past 14 days-calcium level 10 and creatinine 1.1    Recommend getting back on vit D for now -1000 units a day, get some sunshine in the spring, summer and fall-vitamin-D level presently is at 24    Check D level yearly     Repeat dexa in 2-3 year if falling bmd consider forteo/tymlos if appropriate, christy if any fx occur    Chew 1 tums daily (1000 mg CA per tab)  x 2 weeks following Prolia today    Hydrate     Return to the office in 6 months with BMP and recheck D level    Prolonged visit: Over35 min spent in patient care and evaluation. Over 20 min of time used in reviewing past and current labs, past DEXA, and ongoing muscular symptoms, discussing diagnostic possibilities, counseling on vitamin, exercise, fall prevention, and medication options, explaining side effects  of therapies, and coordination of care with  Family Dr   Patient's questions were all addressed and she understands our plans and risks.

## 2019-09-24 ENCOUNTER — TELEPHONE (OUTPATIENT)
Dept: RHEUMATOLOGY | Facility: CLINIC | Age: 84
End: 2019-09-24

## 2019-09-25 NOTE — TELEPHONE ENCOUNTER
Spoke with ms. Rubalcava and informed her of lab results per Dr. Bull and his advisement. Ms. Rubalcava states understanding.

## 2019-09-25 NOTE — TELEPHONE ENCOUNTER
----- Message from Rohit Bull MD sent at 9/22/2019  9:35 PM CDT -----  Please call her and tell her her D level was low again and to start vitamin D3 a 1000 units every day-thanks

## 2020-02-24 ENCOUNTER — PATIENT MESSAGE (OUTPATIENT)
Dept: RHEUMATOLOGY | Facility: CLINIC | Age: 85
End: 2020-02-24

## 2020-03-10 ENCOUNTER — LAB VISIT (OUTPATIENT)
Dept: LAB | Facility: HOSPITAL | Age: 85
End: 2020-03-10
Attending: PHYSICIAN ASSISTANT
Payer: MEDICARE

## 2020-03-10 DIAGNOSIS — Z51.81 MEDICATION MONITORING ENCOUNTER: ICD-10-CM

## 2020-03-10 DIAGNOSIS — E55.9 VITAMIN D DEFICIENCY: ICD-10-CM

## 2020-03-10 LAB
ANION GAP SERPL CALC-SCNC: 6 MMOL/L (ref 8–16)
BUN SERPL-MCNC: 17 MG/DL (ref 8–23)
CALCIUM SERPL-MCNC: 10.1 MG/DL (ref 8.7–10.5)
CHLORIDE SERPL-SCNC: 105 MMOL/L (ref 95–110)
CO2 SERPL-SCNC: 28 MMOL/L (ref 23–29)
CREAT SERPL-MCNC: 1 MG/DL (ref 0.5–1.4)
EST. GFR  (AFRICAN AMERICAN): 59 ML/MIN/1.73 M^2
EST. GFR  (NON AFRICAN AMERICAN): 51 ML/MIN/1.73 M^2
GLUCOSE SERPL-MCNC: 96 MG/DL (ref 70–110)
POTASSIUM SERPL-SCNC: 5.2 MMOL/L (ref 3.5–5.1)
SODIUM SERPL-SCNC: 139 MMOL/L (ref 136–145)

## 2020-03-10 PROCEDURE — 80048 BASIC METABOLIC PNL TOTAL CA: CPT

## 2020-03-10 PROCEDURE — 36415 COLL VENOUS BLD VENIPUNCTURE: CPT

## 2020-03-10 PROCEDURE — 82306 VITAMIN D 25 HYDROXY: CPT

## 2020-03-11 LAB — 25(OH)D3+25(OH)D2 SERPL-MCNC: 20 NG/ML (ref 30–96)

## 2020-03-17 ENCOUNTER — PATIENT MESSAGE (OUTPATIENT)
Dept: RHEUMATOLOGY | Facility: CLINIC | Age: 85
End: 2020-03-17

## 2020-10-06 ENCOUNTER — PATIENT MESSAGE (OUTPATIENT)
Dept: ADMINISTRATIVE | Facility: HOSPITAL | Age: 85
End: 2020-10-06

## 2020-10-23 ENCOUNTER — HOSPITAL ENCOUNTER (OUTPATIENT)
Dept: RADIOLOGY | Facility: HOSPITAL | Age: 85
Discharge: HOME | End: 2020-10-23
Attending: REGISTERED NURSE
Payer: MEDICARE

## 2020-10-23 ENCOUNTER — OFFICE VISIT (OUTPATIENT)
Dept: FAMILY MEDICINE | Facility: CLINIC | Age: 85
End: 2020-10-23
Payer: MEDICARE

## 2020-10-23 VITALS
TEMPERATURE: 99 F | BODY MASS INDEX: 20.04 KG/M2 | HEIGHT: 60 IN | WEIGHT: 102.06 LBS | SYSTOLIC BLOOD PRESSURE: 140 MMHG | HEART RATE: 103 BPM | DIASTOLIC BLOOD PRESSURE: 80 MMHG

## 2020-10-23 DIAGNOSIS — C49.22 LEIOMYOSARCOMA OF LEFT THIGH: ICD-10-CM

## 2020-10-23 DIAGNOSIS — R05.3 CHRONIC COUGH: ICD-10-CM

## 2020-10-23 DIAGNOSIS — R05.3 CHRONIC COUGH: Primary | ICD-10-CM

## 2020-10-23 DIAGNOSIS — R06.02 SOB (SHORTNESS OF BREATH) ON EXERTION: ICD-10-CM

## 2020-10-23 PROBLEM — M25.552 HIP PAIN, ACUTE, LEFT: Chronic | Status: RESOLVED | Noted: 2019-09-16 | Resolved: 2020-10-23

## 2020-10-23 PROBLEM — Z51.81 MEDICATION MONITORING ENCOUNTER: Status: RESOLVED | Noted: 2017-09-07 | Resolved: 2020-10-23

## 2020-10-23 PROCEDURE — 99999 PR PBB SHADOW E&M-EST. PATIENT-LVL III: CPT | Mod: PBBFAC,,, | Performed by: REGISTERED NURSE

## 2020-10-23 PROCEDURE — 71046 X-RAY EXAM CHEST 2 VIEWS: CPT | Mod: 26,,, | Performed by: RADIOLOGY

## 2020-10-23 PROCEDURE — 1126F PR PAIN SEVERITY QUANTIFIED, NO PAIN PRESENT: ICD-10-PCS | Mod: S$GLB,,, | Performed by: REGISTERED NURSE

## 2020-10-23 PROCEDURE — 1159F PR MEDICATION LIST DOCUMENTED IN MEDICAL RECORD: ICD-10-PCS | Mod: S$GLB,,, | Performed by: REGISTERED NURSE

## 2020-10-23 PROCEDURE — 90694 VACC AIIV4 NO PRSRV 0.5ML IM: CPT | Mod: S$GLB,,, | Performed by: REGISTERED NURSE

## 2020-10-23 PROCEDURE — 1101F PT FALLS ASSESS-DOCD LE1/YR: CPT | Mod: CPTII,S$GLB,, | Performed by: REGISTERED NURSE

## 2020-10-23 PROCEDURE — 1126F AMNT PAIN NOTED NONE PRSNT: CPT | Mod: S$GLB,,, | Performed by: REGISTERED NURSE

## 2020-10-23 PROCEDURE — 90694 FLU VACCINE - QUADRIVALENT - ADJUVANTED: ICD-10-PCS | Mod: S$GLB,,, | Performed by: REGISTERED NURSE

## 2020-10-23 PROCEDURE — 71046 XR CHEST PA AND LATERAL: ICD-10-PCS | Mod: 26,,, | Performed by: RADIOLOGY

## 2020-10-23 PROCEDURE — 1159F MED LIST DOCD IN RCRD: CPT | Mod: S$GLB,,, | Performed by: REGISTERED NURSE

## 2020-10-23 PROCEDURE — G0008 ADMIN INFLUENZA VIRUS VAC: HCPCS | Mod: S$GLB,,, | Performed by: REGISTERED NURSE

## 2020-10-23 PROCEDURE — 99999 PR PBB SHADOW E&M-EST. PATIENT-LVL III: ICD-10-PCS | Mod: PBBFAC,,, | Performed by: REGISTERED NURSE

## 2020-10-23 PROCEDURE — 99214 OFFICE O/P EST MOD 30 MIN: CPT | Mod: 25,S$GLB,, | Performed by: REGISTERED NURSE

## 2020-10-23 PROCEDURE — 1101F PR PT FALLS ASSESS DOC 0-1 FALLS W/OUT INJ PAST YR: ICD-10-PCS | Mod: CPTII,S$GLB,, | Performed by: REGISTERED NURSE

## 2020-10-23 PROCEDURE — 71046 X-RAY EXAM CHEST 2 VIEWS: CPT | Mod: TC,FY,PO

## 2020-10-23 PROCEDURE — G0008 FLU VACCINE - QUADRIVALENT - ADJUVANTED: ICD-10-PCS | Mod: S$GLB,,, | Performed by: REGISTERED NURSE

## 2020-10-23 PROCEDURE — 99214 PR OFFICE/OUTPT VISIT, EST, LEVL IV, 30-39 MIN: ICD-10-PCS | Mod: 25,S$GLB,, | Performed by: REGISTERED NURSE

## 2020-10-23 RX ORDER — BENZONATATE 100 MG/1
100 CAPSULE ORAL 3 TIMES DAILY PRN
Qty: 30 CAPSULE | Refills: 0 | Status: SHIPPED | OUTPATIENT
Start: 2020-10-23 | End: 2020-11-12 | Stop reason: SDUPTHER

## 2020-10-23 NOTE — PROGRESS NOTES
Subjective:       Patient ID: Esperanza Rubalcava is a 87 y.o. female.      Chief Complaint   Patient presents with    Cough       Mrs. Rubalcava is here today with c/o chronic cough, at least 3 months.  Cough is daily, mostly dry but does cough up some mucus in AM.  Admits to minimal water daily, increased coke.  Denies childhood asthma, COPD, or chronic bronchitis.  Does get short-winded very easily when getting out of the car and walking short distances.  No cp or edema.  Never smoked.  Has h/o leiomyosarcoma of left thigh; she reports MD previously told her that it was possible to affect her lungs.      Review of Systems   Constitutional: Negative for activity change, chills, fever and unexpected weight change.   HENT: Negative for hearing loss, rhinorrhea and trouble swallowing.    Eyes: Negative for discharge and visual disturbance.   Respiratory: Positive for cough and shortness of breath. Negative for chest tightness and wheezing.    Cardiovascular: Negative for chest pain and palpitations.   Gastrointestinal: Negative for blood in stool, constipation, diarrhea and vomiting.   Endocrine: Negative for polydipsia and polyuria.   Genitourinary: Negative for difficulty urinating, dysuria, hematuria and menstrual problem.   Musculoskeletal: Negative for arthralgias, joint swelling and neck pain.   Neurological: Negative for weakness and headaches.   Psychiatric/Behavioral: Negative for dysphoric mood.         Review of patient's allergies indicates:  No Known Allergies      Patient Active Problem List   Diagnosis    Leiomyosarcoma of left thigh    Age-related osteoporosis without current pathological fracture    Vitamin D deficiency    Risk for falls    CKD (chronic kidney disease) stage 3, GFR 30-59 ml/min    Levoscoliosis         No current outpatient medications on file.        Past medical, surgical, family and social histories have been reviewed today.      Objective:     Vitals:    10/23/20 1106   BP:  (!) 140/80   Pulse: 103   Temp: 99 °F (37.2 °C)   TempSrc: Oral   Weight: 46.3 kg (102 lb 1.2 oz)   Height: 5' (1.524 m)   PainSc: 0-No pain         Physical Exam  Vitals signs reviewed.   Constitutional:       General: She is not in acute distress.  HENT:      Head: Normocephalic and atraumatic.   Eyes:      Pupils: Pupils are equal, round, and reactive to light.   Neck:      Musculoskeletal: Normal range of motion and neck supple. No muscular tenderness.      Vascular: No carotid bruit.   Cardiovascular:      Rate and Rhythm: Normal rate and regular rhythm.      Pulses: Normal pulses.      Heart sounds: Normal heart sounds. No murmur. No gallop.    Pulmonary:      Effort: Pulmonary effort is normal.      Breath sounds: Normal breath sounds.   Skin:     General: Skin is warm and dry.      Capillary Refill: Capillary refill takes less than 2 seconds.   Neurological:      General: No focal deficit present.      Mental Status: She is alert and oriented to person, place, and time.      Sensory: No sensory deficit.      Motor: No weakness.      Coordination: Coordination normal.      Gait: Gait normal.   Psychiatric:         Mood and Affect: Mood normal.         Behavior: Behavior normal.         Thought Content: Thought content normal.         Judgment: Judgment normal.             Assessment         ICD-10-CM ICD-9-CM    1. Chronic cough  R05 786.2 X-Ray Chest PA And Lateral      Echo Color Flow Doppler? Yes      Complete PFT with bronchodilator      PULSE OXIMETRY WITH REST - PULM      benzonatate (TESSALON) 100 MG capsule   2. SOB (shortness of breath) on exertion  R06.02 786.05 X-Ray Chest PA And Lateral      Echo Color Flow Doppler? Yes      Complete PFT with bronchodilator      PULSE OXIMETRY WITH REST - PULM   3. Leiomyosarcoma of left thigh  C49.22 171.3          Plan       Orders pending.  Tessalon prn cough.  Hydrate, increase daily water intake.      Follow-up       Further treatment plan pending.  RTC  aric.      BARRON Raphael  [facility]

## 2020-10-27 DIAGNOSIS — R93.89 ABNORMAL CHEST X-RAY: ICD-10-CM

## 2020-10-27 DIAGNOSIS — R06.02 SOB (SHORTNESS OF BREATH) ON EXERTION: ICD-10-CM

## 2020-10-27 DIAGNOSIS — R05.3 CHRONIC COUGH: Primary | ICD-10-CM

## 2020-10-27 DIAGNOSIS — Z01.812 PRE-PROCEDURE LAB EXAM: ICD-10-CM

## 2020-10-30 ENCOUNTER — TELEPHONE (OUTPATIENT)
Dept: FAMILY MEDICINE | Facility: CLINIC | Age: 85
End: 2020-10-30

## 2020-10-30 NOTE — TELEPHONE ENCOUNTER
----- Message from May Austin, RT sent at 10/30/2020 10:27 AM CDT -----  Regarding: STAT Labs for CT  Ms Rubalcava needs a STAT CREATININE SERUM ordered and booked at least 1 1/2 hours prior to her CT on Wednesday. If the patient prefers, she can come in any day before then to have them drawn so we will not have to wait on results the day of the scan.     Thanks,   Crystal   [phone]

## 2020-10-30 NOTE — TELEPHONE ENCOUNTER
Called pt and spoke with daughter about lab test needing to be done before ct scan on next Wednesday.  They will go to the [facility] and have it done on Monday 11/2.

## 2020-11-02 ENCOUNTER — LAB VISIT (OUTPATIENT)
Dept: LAB | Facility: HOSPITAL | Age: 85
End: 2020-11-02
Attending: REGISTERED NURSE
Payer: MEDICARE

## 2020-11-02 DIAGNOSIS — Z01.812 PRE-PROCEDURE LAB EXAM: ICD-10-CM

## 2020-11-02 PROCEDURE — 36415 COLL VENOUS BLD VENIPUNCTURE: CPT

## 2020-11-02 PROCEDURE — 82565 ASSAY OF CREATININE: CPT

## 2020-11-03 ENCOUNTER — TELEPHONE (OUTPATIENT)
Dept: RADIOLOGY | Facility: HOSPITAL | Age: 85
End: 2020-11-03

## 2020-11-03 LAB
CREAT SERPL-MCNC: 0.9 MG/DL (ref 0.5–1.4)
EST. GFR  (AFRICAN AMERICAN): >60 ML/MIN/1.73 M^2
EST. GFR  (NON AFRICAN AMERICAN): 57.7 ML/MIN/1.73 M^2

## 2020-11-04 ENCOUNTER — HOSPITAL ENCOUNTER (OUTPATIENT)
Dept: RADIOLOGY | Facility: HOSPITAL | Age: 85
Discharge: HOME | End: 2020-11-04
Attending: REGISTERED NURSE
Payer: MEDICARE

## 2020-11-04 DIAGNOSIS — R93.89 ABNORMAL CHEST X-RAY: ICD-10-CM

## 2020-11-04 DIAGNOSIS — R05.3 CHRONIC COUGH: ICD-10-CM

## 2020-11-04 DIAGNOSIS — R06.02 SOB (SHORTNESS OF BREATH) ON EXERTION: ICD-10-CM

## 2020-11-04 PROCEDURE — 71260 CT CHEST WITH CONTRAST: ICD-10-PCS | Mod: 26,,, | Performed by: RADIOLOGY

## 2020-11-04 PROCEDURE — 71260 CT THORAX DX C+: CPT | Mod: 26,,, | Performed by: RADIOLOGY

## 2020-11-04 PROCEDURE — 25500020 PHARM REV CODE 255: Performed by: REGISTERED NURSE

## 2020-11-04 PROCEDURE — 71260 CT THORAX DX C+: CPT | Mod: TC

## 2020-11-04 RX ADMIN — IOHEXOL 75 ML: 350 INJECTION, SOLUTION INTRAVENOUS at 07:11

## 2020-11-05 ENCOUNTER — TELEPHONE (OUTPATIENT)
Dept: FAMILY MEDICINE | Facility: CLINIC | Age: 85
End: 2020-11-05

## 2020-11-05 DIAGNOSIS — R05.3 CHRONIC COUGH: Primary | ICD-10-CM

## 2020-11-05 DIAGNOSIS — R93.89 ABNORMAL CT SCAN, CHEST: ICD-10-CM

## 2020-11-05 DIAGNOSIS — R06.02 SOBOE (SHORTNESS OF BREATH ON EXERTION): Primary | ICD-10-CM

## 2020-11-05 DIAGNOSIS — R06.02 SOB (SHORTNESS OF BREATH) ON EXERTION: ICD-10-CM

## 2020-11-05 NOTE — TELEPHONE ENCOUNTER
URGENT:  Please contact pt to let her know I am CANCELLING her pulmonary lab appt she has booked for today.  I got her CT scan back and does show extensive lesions.  I am sending her to Heme/Onc for further evaluation.  SHE NEEDS TO BE SEEN ON URGENT BASIS.  This is likely the cause for her chronic cough.  Also, please CANCEL the Echo she has booked on 11/11.  I have informed Radiology this AM about cancelling the appt she has for today in pulm lab.  Thx.

## 2020-11-05 NOTE — TELEPHONE ENCOUNTER
Called pt s/w daughter Thania, informed her that pt will not need to go to her appts today. We have scheduled her for a Hem/onc appt on Friday 11/06/20 at 9 am to review her ct scan. Ms Monteiro verbalized understanding.

## 2020-11-06 ENCOUNTER — OFFICE VISIT (OUTPATIENT)
Dept: HEMATOLOGY/ONCOLOGY | Facility: CLINIC | Age: 85
End: 2020-11-06
Payer: MEDICARE

## 2020-11-06 VITALS
HEART RATE: 108 BPM | OXYGEN SATURATION: 96 % | DIASTOLIC BLOOD PRESSURE: 85 MMHG | SYSTOLIC BLOOD PRESSURE: 144 MMHG | RESPIRATION RATE: 14 BRPM | HEIGHT: 60 IN | TEMPERATURE: 99 F | BODY MASS INDEX: 19.93 KG/M2

## 2020-11-06 DIAGNOSIS — R93.89 ABNORMAL CT SCAN, CHEST: ICD-10-CM

## 2020-11-06 DIAGNOSIS — R05.3 CHRONIC COUGH: ICD-10-CM

## 2020-11-06 DIAGNOSIS — R06.02 SOB (SHORTNESS OF BREATH) ON EXERTION: ICD-10-CM

## 2020-11-06 DIAGNOSIS — R91.8 LUNG MASS: ICD-10-CM

## 2020-11-06 PROCEDURE — 99999 PR PBB SHADOW E&M-EST. PATIENT-LVL IV: CPT | Mod: PBBFAC,,, | Performed by: INTERNAL MEDICINE

## 2020-11-06 PROCEDURE — 1126F PR PAIN SEVERITY QUANTIFIED, NO PAIN PRESENT: ICD-10-PCS | Mod: S$GLB,,, | Performed by: INTERNAL MEDICINE

## 2020-11-06 PROCEDURE — 99205 PR OFFICE/OUTPT VISIT, NEW, LEVL V, 60-74 MIN: ICD-10-PCS | Mod: S$GLB,,, | Performed by: INTERNAL MEDICINE

## 2020-11-06 PROCEDURE — 99205 OFFICE O/P NEW HI 60 MIN: CPT | Mod: S$GLB,,, | Performed by: INTERNAL MEDICINE

## 2020-11-06 PROCEDURE — 1101F PR PT FALLS ASSESS DOC 0-1 FALLS W/OUT INJ PAST YR: ICD-10-PCS | Mod: CPTII,S$GLB,, | Performed by: INTERNAL MEDICINE

## 2020-11-06 PROCEDURE — 1159F MED LIST DOCD IN RCRD: CPT | Mod: S$GLB,,, | Performed by: INTERNAL MEDICINE

## 2020-11-06 PROCEDURE — 1159F PR MEDICATION LIST DOCUMENTED IN MEDICAL RECORD: ICD-10-PCS | Mod: S$GLB,,, | Performed by: INTERNAL MEDICINE

## 2020-11-06 PROCEDURE — 99999 PR PBB SHADOW E&M-EST. PATIENT-LVL IV: ICD-10-PCS | Mod: PBBFAC,,, | Performed by: INTERNAL MEDICINE

## 2020-11-06 PROCEDURE — 1101F PT FALLS ASSESS-DOCD LE1/YR: CPT | Mod: CPTII,S$GLB,, | Performed by: INTERNAL MEDICINE

## 2020-11-06 PROCEDURE — 1126F AMNT PAIN NOTED NONE PRSNT: CPT | Mod: S$GLB,,, | Performed by: INTERNAL MEDICINE

## 2020-11-06 NOTE — LETTER
November 6, 2020      Yamil Teresa, NP  [address]  [place] [address]           [facility]ematology Oncology  [address]  [place] [address]  Phone: [phone]  Fax: [phone]          Patient: Esperanza Rubalcava   MR Number: [id number]   YOB: 1933   Date of Visit: 11/6/2020       Dear Yamil Teresa:    Thank you for referring Esperanza Rubalcava to me for evaluation. Attached you will find relevant portions of my assessment and plan of care.    If you have questions, please do not hesitate to call me. I look forward to following Esperanza Rubalcava along with you.    Sincerely,    Sheldon Kemp MD    Enclosure  CC:  No Recipients    If you would like to receive this communication electronically, please contact [email or web address] or [phone] to request more information on [employer] access.    For providers and/or their staff who would like to refer a patient to [facility], please contact us through our one-stop-shop provider referral line, [facility], at [phone].    If you feel you have received this communication in error or would no longer like to receive these types of communications, please e-mail [email or web address]

## 2020-11-06 NOTE — PROGRESS NOTES
Subjective:       Patient ID: Esperanza Rubalcava is a 87 y.o. female.    Chief Complaint: No chief complaint on file.    HPI     Mrs. Rubalcava is an 87-year-old female who is referred for evaluation after a chest x-ray showed a right upper lobe mass and a CT scan of the chest scan showed multiple ground-glass opacities and also in mass at the right hilum (reviewed).  The patient relates that she has had a chronic cough for the last 3 months and that is what prompted the chest x-ray.    PAST MEDICAL HISTORY:  She is relatively healthy for her age.  She has a history of scoliosis but no other chronic illnesses.  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  She also has a remote history of tonsillectomy, appendectomy, partial hysterectomy followed by removal of her ovaries.  She also has a remote history of a right breast biopsy.  SOCIAL HISTORY:  She is  and she lives with her daughter.  She used to work at the [employer]  office and as a .  She was never smoker and does not drink alcohol.  FAMILY HISTORY:  Negative for cancer in the immediate family      Review of Systems    Overall she feels well. She does complain of mild shortness of breath with exertion.  She also complains of her chronic cough which has been present for at least 3 months and it is mainly dry but occasionally productive.  Her daughter relates a 25 lbs weight loss over the last year.  The patient denies any anxiety, depression, easy bruising, fevers, chills, night  sweats, nausea, vomiting, diarrhea, constipation, diplopia,     blurred vision, headache, chest pain, palpitations, shortness of breath at rest, breast pain, abdominal pain, extremity pain, or difficulty ambulating.  The remainder of the ten-point ROS, including general, skin, lymph, H/N, cardiorespiratory, GI, ,  Neuro, Endocrine, and psychiatric is negative.     Objective:      Physical Exam      She is alert, oriented to time, place, person, pleasant, well      nourished, in no acute physical distress.   She is accompanied by her daughter, while his 2nd daughter participated over the phone.                                  VITAL SIGNS:  Reviewed                                      HEENT:  Normal.  There are no nasal, oral, lip, gingival, auricular, lid,    or conjunctival lesions.  Mucosae are moist and pink, and there is no        thrush.  Pupils are equal, reactive to light and accommodation.              Extraocular muscle movements are intact.  Dentition is fair.  There is no frontal or maxillary tenderness.                                     NECK:  Supple without JVD, adenopathy, or thyromegaly.                       LUNGS:   with few scattered rhonchi.  Scoliosis is noted           CARDIOVASCULAR:  Reveals an S1, S2, no murmurs, no rubs, no gallops.         ABDOMEN:  Soft, nontender, without organomegaly.  Bowel sounds are present.                                                                     EXTREMITIES:  No cyanosis, clubbing, or edema.  There is a scar on the left tibia anteriorly from the removal of her sarcoma.                               BREASTS:   there are no masses in either breast.  There is a scar from an excisional biopsy in the upper outer quadrant of the right breast.                                   LYMPHATIC:  There is no cervical, axillary, or supraclavicular adenopathy.   SKIN:  Warm and moist, without petechiae, rashes, induration, or ecchymoses.           NEUROLOGIC:  DTRs are 0-1+ bilaterally, symmetrical, motor function is 5/5,  and cranial nerves are  within normal limits.    Assessment:       1. Lung mass   2. SOB (shortness of breath) on exertion    3. Abnormal CT scan, chest    4. History of leiomyosarcoma       Plan:       I had a long discussion with Mrs Rubalcava and her two  daughters.  I explained to them that it is quite likely that she has lung cancer.  I pointed out that she would need a lung biopsy.  I explained to her that in the event that she has an adenocarcinoma with an EGFR or other mutation, her treatment may consist of oral medications only.  Given her age, she is not a good candidate for chemotherapy and she is not interested in pursuing chemotherapy anyway.  I have asked her to make a decision by next week whether she wants to pursue a biopsy.  I have also asked Dr. Bonilla to review her scans and let me know whether he feels that the right hilar mass is amenable to a CT-guided biopsy.  If Mrs. Rubalcava decides to pursue a biopsy, we will arrange.  Otherwise, it would be reasonable to refer to hospice given her advanced age.  Finally, I explained to her that should she decide to pursue the diagnostic workup any further, she will need additional scans and labs.  She and her daughters voiced understanding.  Their multiple questions were answered to their satisfaction.  I spent approximately 65 minutes reviewing the available records and evaluating the patient, out of which over 50% of the time was spent face to face with the patient in counseling and coordinating this patient's care.  RTC 1 week.

## 2020-11-10 ENCOUNTER — TELEPHONE (OUTPATIENT)
Dept: HEMATOLOGY/ONCOLOGY | Facility: CLINIC | Age: 85
End: 2020-11-10

## 2020-11-10 NOTE — TELEPHONE ENCOUNTER
Spoke with pt's dtr to ask if pt could come for 850am on 11/13/20 instead of 820 am. Dtr states 850 am is fine, pt will be there.

## 2020-11-13 ENCOUNTER — OFFICE VISIT (OUTPATIENT)
Dept: HEMATOLOGY/ONCOLOGY | Facility: CLINIC | Age: 85
End: 2020-11-13
Payer: MEDICARE

## 2020-11-13 ENCOUNTER — LAB VISIT (OUTPATIENT)
Dept: LAB | Facility: HOSPITAL | Age: 85
End: 2020-11-13
Attending: INTERNAL MEDICINE
Payer: MEDICARE

## 2020-11-13 VITALS
SYSTOLIC BLOOD PRESSURE: 153 MMHG | BODY MASS INDEX: 19.64 KG/M2 | TEMPERATURE: 98 F | WEIGHT: 97.44 LBS | RESPIRATION RATE: 14 BRPM | OXYGEN SATURATION: 98 % | DIASTOLIC BLOOD PRESSURE: 73 MMHG | HEART RATE: 98 BPM | HEIGHT: 59 IN

## 2020-11-13 DIAGNOSIS — R91.8 LUNG MASS: ICD-10-CM

## 2020-11-13 DIAGNOSIS — C49.22 LEIOMYOSARCOMA OF LEFT THIGH: ICD-10-CM

## 2020-11-13 DIAGNOSIS — R91.8 LUNG MASS: Primary | ICD-10-CM

## 2020-11-13 LAB
ALBUMIN SERPL BCP-MCNC: 2.9 G/DL (ref 3.5–5.2)
ALP SERPL-CCNC: 77 U/L (ref 55–135)
ALT SERPL W/O P-5'-P-CCNC: 8 U/L (ref 10–44)
ANION GAP SERPL CALC-SCNC: 13 MMOL/L (ref 8–16)
AST SERPL-CCNC: 15 U/L (ref 10–40)
BILIRUB SERPL-MCNC: 0.4 MG/DL (ref 0.1–1)
BUN SERPL-MCNC: 11 MG/DL (ref 8–23)
CALCIUM SERPL-MCNC: 10.1 MG/DL (ref 8.7–10.5)
CHLORIDE SERPL-SCNC: 100 MMOL/L (ref 95–110)
CO2 SERPL-SCNC: 24 MMOL/L (ref 23–29)
CREAT SERPL-MCNC: 0.8 MG/DL (ref 0.5–1.4)
ERYTHROCYTE [DISTWIDTH] IN BLOOD BY AUTOMATED COUNT: 14.6 % (ref 11.5–14.5)
EST. GFR  (AFRICAN AMERICAN): >60 ML/MIN/1.73 M^2
EST. GFR  (NON AFRICAN AMERICAN): >60 ML/MIN/1.73 M^2
GLUCOSE SERPL-MCNC: 101 MG/DL (ref 70–110)
HCT VFR BLD AUTO: 38.5 % (ref 37–48.5)
HGB BLD-MCNC: 11.9 G/DL (ref 12–16)
IMM GRANULOCYTES # BLD AUTO: 0.04 K/UL (ref 0–0.04)
MCH RBC QN AUTO: 28.9 PG (ref 27–31)
MCHC RBC AUTO-ENTMCNC: 30.9 G/DL (ref 32–36)
MCV RBC AUTO: 93 FL (ref 82–98)
NEUTROPHILS # BLD AUTO: 4.9 K/UL (ref 1.8–7.7)
PLATELET # BLD AUTO: 472 K/UL (ref 150–350)
PMV BLD AUTO: 9 FL (ref 9.2–12.9)
POTASSIUM SERPL-SCNC: 3.7 MMOL/L (ref 3.5–5.1)
PROT SERPL-MCNC: 7.5 G/DL (ref 6–8.4)
RBC # BLD AUTO: 4.12 M/UL (ref 4–5.4)
SODIUM SERPL-SCNC: 137 MMOL/L (ref 136–145)
WBC # BLD AUTO: 6.49 K/UL (ref 3.9–12.7)

## 2020-11-13 PROCEDURE — 85027 COMPLETE CBC AUTOMATED: CPT

## 2020-11-13 PROCEDURE — 99214 OFFICE O/P EST MOD 30 MIN: CPT | Mod: S$GLB,,, | Performed by: INTERNAL MEDICINE

## 2020-11-13 PROCEDURE — 99999 PR PBB SHADOW E&M-EST. PATIENT-LVL IV: ICD-10-PCS | Mod: PBBFAC,,, | Performed by: INTERNAL MEDICINE

## 2020-11-13 PROCEDURE — 80053 COMPREHEN METABOLIC PANEL: CPT

## 2020-11-13 PROCEDURE — 99999 PR PBB SHADOW E&M-EST. PATIENT-LVL IV: CPT | Mod: PBBFAC,,, | Performed by: INTERNAL MEDICINE

## 2020-11-13 PROCEDURE — 1101F PR PT FALLS ASSESS DOC 0-1 FALLS W/OUT INJ PAST YR: ICD-10-PCS | Mod: CPTII,S$GLB,, | Performed by: INTERNAL MEDICINE

## 2020-11-13 PROCEDURE — 1126F PR PAIN SEVERITY QUANTIFIED, NO PAIN PRESENT: ICD-10-PCS | Mod: S$GLB,,, | Performed by: INTERNAL MEDICINE

## 2020-11-13 PROCEDURE — 1159F MED LIST DOCD IN RCRD: CPT | Mod: S$GLB,,, | Performed by: INTERNAL MEDICINE

## 2020-11-13 PROCEDURE — 3288F FALL RISK ASSESSMENT DOCD: CPT | Mod: CPTII,S$GLB,, | Performed by: INTERNAL MEDICINE

## 2020-11-13 PROCEDURE — 36415 COLL VENOUS BLD VENIPUNCTURE: CPT

## 2020-11-13 PROCEDURE — 99214 PR OFFICE/OUTPT VISIT, EST, LEVL IV, 30-39 MIN: ICD-10-PCS | Mod: S$GLB,,, | Performed by: INTERNAL MEDICINE

## 2020-11-13 PROCEDURE — 1126F AMNT PAIN NOTED NONE PRSNT: CPT | Mod: S$GLB,,, | Performed by: INTERNAL MEDICINE

## 2020-11-13 PROCEDURE — 3288F PR FALLS RISK ASSESSMENT DOCUMENTED: ICD-10-PCS | Mod: CPTII,S$GLB,, | Performed by: INTERNAL MEDICINE

## 2020-11-13 PROCEDURE — 1159F PR MEDICATION LIST DOCUMENTED IN MEDICAL RECORD: ICD-10-PCS | Mod: S$GLB,,, | Performed by: INTERNAL MEDICINE

## 2020-11-13 PROCEDURE — 1101F PT FALLS ASSESS-DOCD LE1/YR: CPT | Mod: CPTII,S$GLB,, | Performed by: INTERNAL MEDICINE

## 2020-11-13 NOTE — PROGRESS NOTES
Subjective:       Patient ID: Esperanza Rubalcava is a 87 y.o. female.    Chief Complaint: No chief complaint on file.    HPI     Mrs. Rubalcava returns today for follow-up.  Briefly, she is an 87-year-old female who was referred last week for evaluation after a chest x-ray showed a right upper lobe mass and a CT scan of the chest scan showed multiple ground-glass opacities and also a mass at the right hilum.  The patient relates that she has had a chronic cough for the last 3 months and that is what prompted the chest x-ray.      Review of Systems    Overall she feels well. She does complain of mild shortness of breath with exertion.  She also complains of her chronic cough which has been present for at least 3 months and it is mainly dry but occasionally productive.  When asked, she states that her appetite is rather poor.  Her daughter relates a 25 lbs weight loss over the last year.  The patient denies any anxiety, depression, easy bruising, fevers, chills, night  sweats, nausea, vomiting, diarrhea, constipation, diplopia, blurred vision, headache, chest pain, palpitations, shortness of breath at rest, breast pain, abdominal pain, extremity pain, or difficulty ambulating.  The remainder of the ten-point ROS, including general, skin, lymph, H/N, cardiorespiratory, GI, , Neuro, Endocrine, and psychiatric is negative.     Objective:      Physical Exam      She is alert, oriented to time, place, person, pleasant, well      nourished, in no acute physical distress.   She is accompanied by her daughter, while his 2nd daughter participated over the phone.                                  VITAL SIGNS:  Reviewed                                      HEENT:  Normal.  There are no nasal, oral, lip, gingival, auricular, lid,    or conjunctival lesions.  Mucosae are moist and pink, and there is no        thrush.  Pupils are equal, reactive to light and accommodation.              Extraocular muscle movements are  intact.  Dentition is fair.  There is no frontal or maxillary tenderness.                                     NECK:  Supple without JVD, adenopathy, or thyromegaly.                       LUNGS:   with few scattered rhonchi.  Scoliosis is noted           CARDIOVASCULAR:  Reveals an S1, S2, no murmurs, no rubs, no gallops.         ABDOMEN:  Soft, nontender, without organomegaly.  Bowel sounds are present.                                                                     EXTREMITIES:  No cyanosis, clubbing, or edema.  There is a scar on the left tibia anteriorly from the removal of her sarcoma.                               BREASTS:   there are no masses in either breast.  There is a scar from an excisional biopsy in the upper outer quadrant of the right breast.                                   LYMPHATIC:  There is no cervical, axillary, or supraclavicular adenopathy.   SKIN:  Warm and moist, without petechiae, rashes, induration, or ecchymoses.           NEUROLOGIC:  DTRs are 0-1+ bilaterally, symmetrical, motor function is 5/5,  and cranial nerves are  within normal limits.    Assessment:       1. Lung mass   2. SOB (shortness of breath) on exertion    3. Abnormal CT scan, chest    4. History of leiomyosarcoma       Plan:       I had a long discussion with Mrs Rubalcava and her two daughters.  At the end of the discussion  stated that she would like to pursue a biopsy.  She understands that this is not a therapeutic procedure.  She is not interested in receiving chemotherapy and the purpose of the biopsy is  1.  To establish a diagnosis  2.  To determine whether she has any of the mutations that would allow her to be treated with an oral agent.  I have asked Dr. Bonilla to proceed with a CT-guided biopsy.  We will also complete her staging workup with a bone scan and a CT scan of the abdomen and pelvis.  In addition, we will obtain basic labs today.  Assuming that she will have a biopsy this week, I will  see her in 2 weeks for follow-up.  Her multiple questions were answered to her satisfaction.  Duration of the visit was 40 min

## 2020-11-13 NOTE — Clinical Note
Labs today.  Needs bone scan and CT of the abdomen and pelvis.  We will see her in 2 weeks and she will hopefully have a biopsy by then

## 2020-11-23 ENCOUNTER — TELEPHONE (OUTPATIENT)
Dept: RADIOLOGY | Facility: HOSPITAL | Age: 85
End: 2020-11-23

## 2020-11-24 ENCOUNTER — HOSPITAL ENCOUNTER (OUTPATIENT)
Dept: RADIOLOGY | Facility: HOSPITAL | Age: 85
Discharge: HOME | End: 2020-11-24
Attending: INTERNAL MEDICINE
Payer: MEDICARE

## 2020-11-24 DIAGNOSIS — R91.8 LUNG MASS: ICD-10-CM

## 2020-11-24 PROCEDURE — A9689 PHARM REV CODE 250: HCPCS | Performed by: INTERNAL MEDICINE

## 2020-11-24 PROCEDURE — 25000003 PHARM REV CODE 250: Performed by: INTERNAL MEDICINE

## 2020-11-24 PROCEDURE — 78306 BONE IMAGING WHOLE BODY: CPT | Mod: 26,,, | Performed by: RADIOLOGY

## 2020-11-24 PROCEDURE — 74177 CT ABD & PELVIS W/CONTRAST: CPT | Mod: 26,,, | Performed by: RADIOLOGY

## 2020-11-24 PROCEDURE — 25500020 PHARM REV CODE 255: Performed by: INTERNAL MEDICINE

## 2020-11-24 PROCEDURE — 74177 CT ABD & PELVIS W/CONTRAST: CPT | Mod: TC

## 2020-11-24 PROCEDURE — 74177 CT ABDOMEN PELVIS WITH CONTRAST: ICD-10-PCS | Mod: 26,,, | Performed by: RADIOLOGY

## 2020-11-24 PROCEDURE — A9503 TC99M MEDRONATE: HCPCS

## 2020-11-24 PROCEDURE — 78306 NM BONE SCAN WHOLE BODY: ICD-10-PCS | Mod: 26,,, | Performed by: RADIOLOGY

## 2020-11-24 RX ADMIN — IOHEXOL 1000 ML: 12 SOLUTION ORAL at 10:11

## 2020-11-24 RX ADMIN — IOHEXOL 75 ML: 350 INJECTION, SOLUTION INTRAVENOUS at 11:11

## 2020-11-25 ENCOUNTER — TELEPHONE (OUTPATIENT)
Dept: HEMATOLOGY/ONCOLOGY | Facility: CLINIC | Age: 85
End: 2020-11-25

## 2020-11-25 NOTE — TELEPHONE ENCOUNTER
----- Message from Annette Wise sent at 11/25/2020 11:43 AM CST -----  BR pt!  Thanks  ----- Message -----  From: Renetta King  Sent: 11/25/2020  11:40 AM CST  To: Viridiana Chung Staff    Please call pt Thania daughter @ [phone], states she have question about pt medication before  having biopsy .

## 2020-11-25 NOTE — TELEPHONE ENCOUNTER
Returned daughter ( Thania  ) ph call. She called inquiring about what meds her mom had to be off of 1 wk prior to her procedure. I informed her all blood thinners, and fish oil.. she states her mom only takes tessalon perles when needed, and tylenol when needed.

## 2020-11-27 ENCOUNTER — SOCIAL WORK (OUTPATIENT)
Dept: HEMATOLOGY/ONCOLOGY | Facility: CLINIC | Age: 85
End: 2020-11-27

## 2020-11-27 ENCOUNTER — TELEPHONE (OUTPATIENT)
Dept: RADIOLOGY | Facility: HOSPITAL | Age: 85
End: 2020-11-27

## 2020-11-27 ENCOUNTER — OFFICE VISIT (OUTPATIENT)
Dept: HEMATOLOGY/ONCOLOGY | Facility: CLINIC | Age: 85
End: 2020-11-27
Payer: MEDICARE

## 2020-11-27 VITALS
HEIGHT: 60 IN | OXYGEN SATURATION: 98 % | BODY MASS INDEX: 18.87 KG/M2 | WEIGHT: 96.13 LBS | HEART RATE: 108 BPM | SYSTOLIC BLOOD PRESSURE: 145 MMHG | TEMPERATURE: 98 F | DIASTOLIC BLOOD PRESSURE: 90 MMHG

## 2020-11-27 DIAGNOSIS — M41.80 LEVOSCOLIOSIS: Chronic | ICD-10-CM

## 2020-11-27 DIAGNOSIS — N18.30 STAGE 3 CHRONIC KIDNEY DISEASE, UNSPECIFIED WHETHER STAGE 3A OR 3B CKD: ICD-10-CM

## 2020-11-27 DIAGNOSIS — R91.8 LUNG MASS: Primary | ICD-10-CM

## 2020-11-27 DIAGNOSIS — R79.1 ABNORMAL BLOOD COAGULATION PROFILE: ICD-10-CM

## 2020-11-27 DIAGNOSIS — C49.22 LEIOMYOSARCOMA OF LEFT THIGH: ICD-10-CM

## 2020-11-27 DIAGNOSIS — Z91.81 RISK FOR FALLS: ICD-10-CM

## 2020-11-27 DIAGNOSIS — C79.9 METASTATIC MALIGNANT NEOPLASM, UNSPECIFIED SITE: Primary | ICD-10-CM

## 2020-11-27 DIAGNOSIS — M81.0 AGE-RELATED OSTEOPOROSIS WITHOUT CURRENT PATHOLOGICAL FRACTURE: ICD-10-CM

## 2020-11-27 DIAGNOSIS — E55.9 VITAMIN D DEFICIENCY: ICD-10-CM

## 2020-11-27 PROCEDURE — 3288F PR FALLS RISK ASSESSMENT DOCUMENTED: ICD-10-PCS | Mod: CPTII,S$GLB,, | Performed by: INTERNAL MEDICINE

## 2020-11-27 PROCEDURE — 1159F MED LIST DOCD IN RCRD: CPT | Mod: S$GLB,,, | Performed by: INTERNAL MEDICINE

## 2020-11-27 PROCEDURE — 99214 OFFICE O/P EST MOD 30 MIN: CPT | Mod: S$GLB,,, | Performed by: INTERNAL MEDICINE

## 2020-11-27 PROCEDURE — 1101F PR PT FALLS ASSESS DOC 0-1 FALLS W/OUT INJ PAST YR: ICD-10-PCS | Mod: CPTII,S$GLB,, | Performed by: INTERNAL MEDICINE

## 2020-11-27 PROCEDURE — 99214 PR OFFICE/OUTPT VISIT, EST, LEVL IV, 30-39 MIN: ICD-10-PCS | Mod: S$GLB,,, | Performed by: INTERNAL MEDICINE

## 2020-11-27 PROCEDURE — 1126F AMNT PAIN NOTED NONE PRSNT: CPT | Mod: S$GLB,,, | Performed by: INTERNAL MEDICINE

## 2020-11-27 PROCEDURE — 1101F PT FALLS ASSESS-DOCD LE1/YR: CPT | Mod: CPTII,S$GLB,, | Performed by: INTERNAL MEDICINE

## 2020-11-27 PROCEDURE — 3288F FALL RISK ASSESSMENT DOCD: CPT | Mod: CPTII,S$GLB,, | Performed by: INTERNAL MEDICINE

## 2020-11-27 PROCEDURE — 99999 PR PBB SHADOW E&M-EST. PATIENT-LVL III: ICD-10-PCS | Mod: PBBFAC,,, | Performed by: INTERNAL MEDICINE

## 2020-11-27 PROCEDURE — 99999 PR PBB SHADOW E&M-EST. PATIENT-LVL III: CPT | Mod: PBBFAC,,, | Performed by: INTERNAL MEDICINE

## 2020-11-27 PROCEDURE — 1126F PR PAIN SEVERITY QUANTIFIED, NO PAIN PRESENT: ICD-10-PCS | Mod: S$GLB,,, | Performed by: INTERNAL MEDICINE

## 2020-11-27 PROCEDURE — 1159F PR MEDICATION LIST DOCUMENTED IN MEDICAL RECORD: ICD-10-PCS | Mod: S$GLB,,, | Performed by: INTERNAL MEDICINE

## 2020-11-27 NOTE — H&P (VIEW-ONLY)
Subjective:       Patient ID: Esperanza Rubalcava is a 87 y.o. female.    Chief Complaint: No chief complaint on file.    HPI     Mrs. Rubalcava returns today for follow-up.  Apparently her lung biopsy has been schedule 3 days from now on Monday the 30th of November.    Briefly, she is an 87-year-old female who was referred earlier this month for evaluation after a chest x-ray showed a right upper lobe mass and a CT scan of the chest scan showed multiple ground-glass opacities and also a mass at the right hilum.  The patient relates that she has had a chronic cough for the last 3 months and that is what prompted the chest x-ray.    A recent CT of the abdomen and pelvis last week shows a lesion at L4.  A bone scan shows an expansile lesion on the left 11th rib posterior laterally and the destruction of L4.    Her most recent labs from 2 weeks ago had shown a white count of 6400 per cubic mm, hemoglobin 11.9 grams/deciliter, hematocrit 38.5%, and platelets 435878 per cubic mm.  Renal function and hepatic function were normal.      Review of Systems    Overall she feels well. She again complains of mild shortness of breath with exertion.  She also complains of her chronic cough which has been present for at least 3 months and it is mainly dry but occasionally productive.  When asked, she states that her appetite is rather poor.  Her daughter relates a 25 lbs weight loss over the last year.  The patient denies any anxiety, depression, easy bruising, fevers, chills, night  sweats, nausea, vomiting, diarrhea, constipation, diplopia, blurred vision, headache, chest pain, palpitations, shortness of breath at rest, breast pain, abdominal pain, extremity pain, or difficulty ambulating.  The remainder of the ten-point ROS, including general, skin, lymph, H/N, cardiorespiratory, GI, , Neuro, Endocrine, and psychiatric is negative.     Objective:      Physical Exam      She is alert, oriented to time, place, person, pleasant,  well      nourished, in no acute physical distress.   She is accompanied by her daughter, while his 2nd daughter participated over the phone.                                  VITAL SIGNS:  Reviewed                                      HEENT:  Normal.  There are no nasal, oral, lip, gingival, auricular, lid,    or conjunctival lesions.  Mucosae are moist and pink, and there is no        thrush.  Pupils are equal, reactive to light and accommodation.              Extraocular muscle movements are intact.  Dentition is fair.  There is no frontal or maxillary tenderness.                                     NECK:  Supple without JVD, adenopathy, or thyromegaly.                       LUNGS:   with few scattered rhonchi.  Scoliosis is noted.  There is no tenderness on palpation of the left rib cage posteriorly           CARDIOVASCULAR:  Reveals an S1, S2, no murmurs, no rubs, no gallops.         ABDOMEN:  Soft, nontender, without organomegaly.  Bowel sounds are present.                                                                     EXTREMITIES:  No cyanosis, clubbing, or edema.  There is a scar on the left tibia anteriorly from the removal of her sarcoma.                               BREASTS:   deferred today                    LYMPHATIC:  There is no cervical, axillary, or supraclavicular adenopathy.   SKIN:  Warm and moist, without petechiae, rashes, induration, or ecchymoses.           NEUROLOGIC:  DTRs are 0-1+ bilaterally, symmetrical, motor function is 5/5,  and cranial nerves are  within normal limits.    Assessment:       1. Lung mass   2. SOB (shortness of breath) on exertion    3. Probable bone metastases    4. History of leiomyosarcoma       Plan:       I had a long discussion with Mrs Rubalcava and her two daughters.  She will proceed with a biopsy November 30th as planned.  I will see her 2 weeks from today to discuss the biopsy results.  Assuming this is an adenocarcinoma, we will ask the pathologist to  run the lung cancer panel to determine whether she would be a candidate for immunotherapy or oral targeted therapy.  Mrs. Rubalcava will return to see me in 2 weeks with repeat labs.  She knows to call earlier should she experience any worsening shortness of breath or any pain, especially on her ribcage.  We also discussed the option of obtaining an MRI of the brain, she elected to wait for now until her diagnosis is confirmed.  Duration of visit was 30 min.

## 2020-11-27 NOTE — PROGRESS NOTES
Met with pt who was accompanied by her daughter. Pt referred to SW today because she is in need of a wheelchair. Spoke to pt and daughter--they are most interested in something she can be wheeled around in (like if they go shopping). She will not be wheeling herself so a transport chair may be most portable and appropriate. Will ask Dr. Lopez to put in orders and will send to [facility] for processing. Provided pt with SW contact info and encouraged f/u for any future needs. It appears that pt is being worked up for malignancy so SW will follow and further assist in upcoming weeks.    Oncology Social Work   Intake  MD Assigned: Sheldon Kemp  Date of referral to social work: 11/27/20  Initial social work contact: 11/27/20  Referral to initial contact timeline: 0  Referral contact method: Phone  Contact method: In person  Date worked: 11/27/20     Intervention  Current Status: Staging work-up    General Referrals: Other  Other Referral: DME (transport chair)    Support Systems: Children  Concerns: need wheelchair/transport chair     Supportive Checklist      Follow Up  No follow-ups on file.

## 2020-11-27 NOTE — TELEPHONE ENCOUNTER
Interventional RAdiology    Spoke to daughter, Thania, please arrive at hospital on [address] at 730am, npo p mn x sips of water with medications and must have ride.  all questions answered, verbalized understanding of instructions.

## 2020-11-27 NOTE — PROGRESS NOTES
Subjective:       Patient ID: Esperanza Rubalcava is a 87 y.o. female.    Chief Complaint: No chief complaint on file.    HPI     Mrs. Rubalcava returns today for follow-up.  Apparently her lung biopsy has been schedule 3 days from now on Monday the 30th of November.    Briefly, she is an 87-year-old female who was referred earlier this month for evaluation after a chest x-ray showed a right upper lobe mass and a CT scan of the chest scan showed multiple ground-glass opacities and also a mass at the right hilum.  The patient relates that she has had a chronic cough for the last 3 months and that is what prompted the chest x-ray.    A recent CT of the abdomen and pelvis last week shows a lesion at L4.  A bone scan shows an expansile lesion on the left 11th rib posterior laterally and the destruction of L4.    Her most recent labs from 2 weeks ago had shown a white count of 6400 per cubic mm, hemoglobin 11.9 grams/deciliter, hematocrit 38.5%, and platelets [id number] per cubic mm.  Renal function and hepatic function were normal.      Review of Systems    Overall she feels well. She again complains of mild shortness of breath with exertion.  She also complains of her chronic cough which has been present for at least 3 months and it is mainly dry but occasionally productive.  When asked, she states that her appetite is rather poor.  Her daughter relates a 25 lbs weight loss over the last year.  The patient denies any anxiety, depression, easy bruising, fevers, chills, night  sweats, nausea, vomiting, diarrhea, constipation, diplopia, blurred vision, headache, chest pain, palpitations, shortness of breath at rest, breast pain, abdominal pain, extremity pain, or difficulty ambulating.  The remainder of the ten-point ROS, including general, skin, lymph, H/N, cardiorespiratory, GI, , Neuro, Endocrine, and psychiatric is negative.     Objective:      Physical Exam      She is alert, oriented to time, place, person, pleasant,  well      nourished, in no acute physical distress.   She is accompanied by her daughter, while his 2nd daughter participated over the phone.                                  VITAL SIGNS:  Reviewed                                      HEENT:  Normal.  There are no nasal, oral, lip, gingival, auricular, lid,    or conjunctival lesions.  Mucosae are moist and pink, and there is no        thrush.  Pupils are equal, reactive to light and accommodation.              Extraocular muscle movements are intact.  Dentition is fair.  There is no frontal or maxillary tenderness.                                     NECK:  Supple without JVD, adenopathy, or thyromegaly.                       LUNGS:   with few scattered rhonchi.  Scoliosis is noted.  There is no tenderness on palpation of the left rib cage posteriorly           CARDIOVASCULAR:  Reveals an S1, S2, no murmurs, no rubs, no gallops.         ABDOMEN:  Soft, nontender, without organomegaly.  Bowel sounds are present.                                                                     EXTREMITIES:  No cyanosis, clubbing, or edema.  There is a scar on the left tibia anteriorly from the removal of her sarcoma.                               BREASTS:   deferred today                    LYMPHATIC:  There is no cervical, axillary, or supraclavicular adenopathy.   SKIN:  Warm and moist, without petechiae, rashes, induration, or ecchymoses.           NEUROLOGIC:  DTRs are 0-1+ bilaterally, symmetrical, motor function is 5/5,  and cranial nerves are  within normal limits.    Assessment:       1. Lung mass   2. SOB (shortness of breath) on exertion    3. Probable bone metastases    4. History of leiomyosarcoma       Plan:       I had a long discussion with Mrs Rubalcava and her two daughters.  She will proceed with a biopsy November 30th as planned.  I will see her 2 weeks from today to discuss the biopsy results.  Assuming this is an adenocarcinoma, we will ask the pathologist to  run the lung cancer panel to determine whether she would be a candidate for immunotherapy or oral targeted therapy.  Mrs. Rubalcava will return to see me in 2 weeks with repeat labs.  She knows to call earlier should she experience any worsening shortness of breath or any pain, especially on her ribcage.  We also discussed the option of obtaining an MRI of the brain, she elected to wait for now until her diagnosis is confirmed.  Duration of visit was 30 min.

## 2020-11-30 ENCOUNTER — HOSPITAL ENCOUNTER (OUTPATIENT)
Dept: RADIOLOGY | Facility: HOSPITAL | Age: 85
Discharge: HOME | End: 2020-11-30
Attending: PHYSICIAN ASSISTANT
Payer: MEDICARE

## 2020-11-30 ENCOUNTER — HOSPITAL ENCOUNTER (OUTPATIENT)
Dept: RADIOLOGY | Facility: HOSPITAL | Age: 85
Discharge: HOME | End: 2020-11-30
Attending: INTERNAL MEDICINE
Payer: MEDICARE

## 2020-11-30 ENCOUNTER — TELEPHONE (OUTPATIENT)
Dept: HEMATOLOGY/ONCOLOGY | Facility: CLINIC | Age: 85
End: 2020-11-30

## 2020-11-30 VITALS
HEART RATE: 79 BPM | RESPIRATION RATE: 15 BRPM | SYSTOLIC BLOOD PRESSURE: 157 MMHG | DIASTOLIC BLOOD PRESSURE: 70 MMHG | HEIGHT: 59 IN | OXYGEN SATURATION: 97 % | WEIGHT: 96 LBS | BODY MASS INDEX: 19.35 KG/M2

## 2020-11-30 DIAGNOSIS — R91.8 LUNG MASS: ICD-10-CM

## 2020-11-30 LAB — SARS-COV-2 RDRP RESP QL NAA+PROBE: NEGATIVE

## 2020-11-30 PROCEDURE — 71045 X-RAY EXAM CHEST 1 VIEW: CPT | Mod: TC

## 2020-11-30 PROCEDURE — 88342 CHG IMMUNOCYTOCHEMISTRY: ICD-10-PCS | Mod: 26,,, | Performed by: PATHOLOGY

## 2020-11-30 PROCEDURE — 63600175 PHARM REV CODE 636 W HCPCS: Performed by: RADIOLOGY

## 2020-11-30 PROCEDURE — 88307 TISSUE EXAM BY PATHOLOGIST: CPT | Mod: 26,,, | Performed by: PATHOLOGY

## 2020-11-30 PROCEDURE — U0002 COVID-19 LAB TEST NON-CDC: HCPCS

## 2020-11-30 PROCEDURE — 88341 PR IHC OR ICC EACH ADD'L SINGLE ANTIBODY  STAINPR: ICD-10-PCS | Mod: 26,,, | Performed by: PATHOLOGY

## 2020-11-30 PROCEDURE — 88341 IMHCHEM/IMCYTCHM EA ADD ANTB: CPT | Mod: 59 | Performed by: PATHOLOGY

## 2020-11-30 PROCEDURE — 88341 IMHCHEM/IMCYTCHM EA ADD ANTB: CPT | Mod: 26,,, | Performed by: PATHOLOGY

## 2020-11-30 PROCEDURE — 88342 IMHCHEM/IMCYTCHM 1ST ANTB: CPT | Mod: 26,,, | Performed by: PATHOLOGY

## 2020-11-30 PROCEDURE — 88307 TISSUE EXAM BY PATHOLOGIST: CPT | Performed by: PATHOLOGY

## 2020-11-30 PROCEDURE — 88342 IMHCHEM/IMCYTCHM 1ST ANTB: CPT | Performed by: PATHOLOGY

## 2020-11-30 PROCEDURE — 88307 PR  SURG PATH,LEVEL V: ICD-10-PCS | Mod: 26,,, | Performed by: PATHOLOGY

## 2020-11-30 PROCEDURE — 77012 CT SCAN FOR NEEDLE BIOPSY: CPT | Mod: TC

## 2020-11-30 RX ORDER — MIDAZOLAM HYDROCHLORIDE 1 MG/ML
INJECTION INTRAMUSCULAR; INTRAVENOUS CODE/TRAUMA/SEDATION MEDICATION
Status: COMPLETED | OUTPATIENT
Start: 2020-11-30 | End: 2020-11-30

## 2020-11-30 RX ORDER — ASPIRIN 325 MG
325 TABLET ORAL EVERY 6 HOURS PRN
COMMUNITY

## 2020-11-30 RX ORDER — FENTANYL CITRATE 50 UG/ML
INJECTION, SOLUTION INTRAMUSCULAR; INTRAVENOUS CODE/TRAUMA/SEDATION MEDICATION
Status: COMPLETED | OUTPATIENT
Start: 2020-11-30 | End: 2020-11-30

## 2020-11-30 RX ADMIN — MIDAZOLAM HYDROCHLORIDE 0.5 MG: 1 INJECTION, SOLUTION INTRAMUSCULAR; INTRAVENOUS at 10:11

## 2020-11-30 RX ADMIN — FENTANYL CITRATE 25 MCG: 50 INJECTION, SOLUTION INTRAMUSCULAR; INTRAVENOUS at 10:11

## 2020-11-30 NOTE — DISCHARGE SUMMARY
Pre Op Diagnosis: rib lesion     Post Op Diagnosis: same     Procedure:  Rib lesion biopsy     Procedure performed by: Irene HERNANDEZ, Francis HERNANDEZ     Written Informed Consent Obtained: Yes     Specimen Removed:  yes     Estimated Blood Loss:  minimal     Findings: Local anesthesia and moderate sedation were used.     The patient tolerated the procedure well and there were no complications.      Sterile technique was performed in the lateral left thorax, lidocaine was used as a local anesthetic.  Multiple samples taken from the left rib lesion.  Pt tolerated the procedure well without immediate complications.  Please see radiologist report for details. F/u with PCP and/or ordering physician.

## 2020-11-30 NOTE — DISCHARGE INSTRUCTIONS
CT-Guided Lung Biopsy  CT-guided lung biopsy is a procedure to collect small tissue samples from an abnormal area in your lung. During the procedure, an imaging method called CT (computed tomography) is used to show live pictures of your lung. Then a thin needle is used to remove the tissue samples. The samples are tested in a lab for cancer and other problems.     For the biopsy, a thin needle is used to remove samples of tissue from an abnormal area in the lung.   Getting ready for your procedure  Follow any instructions from your healthcare provider.  Tell your provider about any medicines you are taking. It is important for your provider to know if you are taking any blood-thinning medicines or have a bleeding disorder.  You may need to stop taking all or some medicine before the procedure. This includes:  · All prescription medicines  · Blood-thinning medicines (anticoagulants)  · Over-the-counter medicines such as aspirin or ibuprofen  · Street drugs  · Herbs, vitamins, and other supplements  Also tell your provider if you:  · Are pregnant or think you may be pregnant  · Are breastfeeding  · Are allergic to or have intolerances to any medicines  · Have a chronic cough, new cough, or other illness  · Use oxygen therapy at home  · Smoke or drink alcohol on a regular basis  Follow any directions youre given for not eating or drinking before the procedure.  The day of your procedure  The procedure takes about 60 minutes. The entire procedure (including time to prepare and recover) takes several hours. Youll likely go home the same day.  Before the procedure begins:  · An IV (intravenous) line may be put into a vein in your hand or arm. This line supplies fluids and medicines.  · To keep you free of pain during the procedure, you may be given anesthesia. Depending on the type of anesthesia used, you may be awake, drowsy, or in a deep sleep for the procedure.  During the procedure:  · Youll lie on a CT scan  table. You may be on your back, side, or stomach. Pictures of your lung are then taken using the CT scanner. This helps your provider find the best place to position the needle in your lungs.  · A wei is made on your skin where the needle will be inserted (biopsy site). The site is injected with numbing medicine.  · Using the CT pictures as a guide, the needle is passed through the numbed skin between your ribs and into your lung. Samples of tissue are then removed from the abnormal area in your lung. The samples are sent to a lab to be checked for problems.  · When the procedure is complete, the needle is removed. Pressure is applied to the biopsy site to help stop any bleeding. The site is then bandaged.  After the procedure:  · Youll be taken to a room to rest until the anesthesia wears off.  · A chest X-ray may be done. This is to make sure there was no damage to your lungs or the area where the needle was placed.  · When its time for you to go home, have an adult family member or friend ready to drive you.  Recovering at home  You may cough up a small amount of blood shortly after the procedure. Later, you may also have some soreness around the biopsy site. Once at home, follow any instructions youre given. Be sure to:  · Take all medicines as directed.  · Care for the biopsy site as instructed.  · Check for signs of infection at the biopsy site (see below).  · Do not bathe or shower until your provider says it's OK. If you wish, you may wash with a sponge or washcloth.  · Avoid heavy lifting and other strenuous activities as directed.  · If you plan any air travel, ask your provider when you can do so. You may be told not to fly for a few weeks. This is because pressure changes may affect your lungs.  When to call your provider  Call 911 or your local emergency number if you have sudden, severe difficulty breathing. This could be a life-threatening emergency.  Call your healthcare provider for less severe  symptoms that are still concerning. If you are unable to speak with your provider, go to the emergency room if you have any of the following symptoms:  · Fever of 100.4°F (38°C) or higher, or as directed by your provider  · Sudden chest pain, shortness of breath, or fainting  · Coughing up increasing amounts of blood  · Signs of infection at the biopsy site, such as increased redness or swelling, warmth, more pain, bleeding, or bad-smelling drainage   Follow-up  The provider who ordered your test will discuss the biopsy results with you during a follow-up visit. Results are usually ready within 1 to 2 weeks. If more tests or treatments are needed, your provider will discuss these with you.  Risks and possible complications  All procedures have some risk. Possible risks of this procedure include:  · An air leak in your lung (pneumothorax). This may require a stay in the hospital and treatment to re-inflate the lung.  · Bleeding into or around the lung  · Infection in the skin or lung  · Injury to other structures in the chest  · Risks of anesthesia. This will be discussed with you before the procedure.   Date Last Reviewed: 6/11/2015  © 5220-6924 [employer]. [address] [place] [address]. All rights reserved. This information is not intended as a substitute for professional medical care. Always follow your healthcare professional's instructions.        Recovery After Procedural Sedation (Adult)   You have been given medicine by vein to make you sleep during your surgery. This may have included both a pain medicine and sleeping medicine. Most of the effects have worn off. But you may still have some drowsiness for the next 6 to 8 hours.  Home care  Follow these guidelines when you get home:  · For the next 8 hours, you should be watched by a responsible adult. This person should make sure your condition is not getting worse.  · Don't drink any alcohol for the next 24 hours.  · Don't drive,  operate dangerous machinery, or make important business or personal decisions during the next 24 hours.  · To prevent injury or falls, use caution when standing and walking for at least 24 hours after your procedure.  Note: Your healthcare provider may tell you not to take any medicine by mouth for pain or sleep in the next 4 hours. These medicines may react with the medicines you were given in the hospital. This could cause a much stronger response than usual.  Follow-up care  Follow up with your healthcare provider if you are not alert and back to your usual level of activity within 12 hours.  When to seek medical advice  Call your healthcare provider right away if any of these occur:  · Drowsiness gets worse  · Weakness or dizziness gets worse  · Repeated vomiting  · You can't be awakened  · Fever  · New rash  Mya last reviewed this educational content on 9/1/2019  © 8120-8308 [employer]. [address] [place] [address], [place] [address]. All rights reserved. This information is not intended as a substitute for professional medical care. Always follow your healthcare professional's instructions.

## 2020-11-30 NOTE — PLAN OF CARE
Recovery complete.  VSS.  Discussed dc instructions with patient and dtr, both verbalized understanding.  Band Aid to puncture site - C/D/I.  Patient dc'd via WC to private vehicle

## 2020-11-30 NOTE — PROGRESS NOTES
Called [facility] to confirm they rec'd fax from Friday for transport chair; they did receive the fax and the orders are correct so they will fulfill the request. SW will plan to check in with pt again at next appt on 12/11 and ongoing based on treatment plan.    Oncology Social Work   Intake  MD Assigned: Sheldon Kemp  Date of referral to social work: 11/27/20  Initial social work contact: 11/27/20  Referral to initial contact timeline: 0  Referral contact method: Phone  Contact method: In person  Date worked: 11/30/20     Intervention  Current Status: Staging work-up    General Referrals: Other  Other Referral: DME (transport chair)    Support Systems: Children  Concerns: need wheelchair/transport chair     Supportive Checklist      Follow Up  No follow-ups on file.

## 2020-11-30 NOTE — SEDATION DOCUMENTATION
Patient placed prone on CT table with arms above head.  CM in place.  VSS.  Patient verbalizes understanding of procedure

## 2020-12-08 LAB
FINAL PATHOLOGIC DIAGNOSIS: NORMAL
GROSS: NORMAL
MICROSCOPIC EXAM: NORMAL

## 2020-12-11 ENCOUNTER — OFFICE VISIT (OUTPATIENT)
Dept: HEMATOLOGY/ONCOLOGY | Facility: CLINIC | Age: 85
End: 2020-12-11
Payer: MEDICARE

## 2020-12-11 ENCOUNTER — LAB VISIT (OUTPATIENT)
Dept: LAB | Facility: HOSPITAL | Age: 85
End: 2020-12-11
Attending: INTERNAL MEDICINE
Payer: MEDICARE

## 2020-12-11 VITALS
TEMPERATURE: 98 F | SYSTOLIC BLOOD PRESSURE: 141 MMHG | BODY MASS INDEX: 19.15 KG/M2 | HEART RATE: 77 BPM | DIASTOLIC BLOOD PRESSURE: 93 MMHG | OXYGEN SATURATION: 95 % | HEIGHT: 59 IN | WEIGHT: 95 LBS | RESPIRATION RATE: 14 BRPM

## 2020-12-11 DIAGNOSIS — C49.9 LEIOMYOSARCOMA: Primary | ICD-10-CM

## 2020-12-11 DIAGNOSIS — C79.9 METASTATIC MALIGNANT NEOPLASM, UNSPECIFIED SITE: ICD-10-CM

## 2020-12-11 LAB
ALBUMIN SERPL BCP-MCNC: 3.1 G/DL (ref 3.5–5.2)
ALP SERPL-CCNC: 72 U/L (ref 55–135)
ALT SERPL W/O P-5'-P-CCNC: 7 U/L (ref 10–44)
ANION GAP SERPL CALC-SCNC: 6 MMOL/L (ref 8–16)
AST SERPL-CCNC: 13 U/L (ref 10–40)
BILIRUB SERPL-MCNC: 0.5 MG/DL (ref 0.1–1)
BUN SERPL-MCNC: 11 MG/DL (ref 8–23)
CALCIUM SERPL-MCNC: 9 MG/DL (ref 8.7–10.5)
CHLORIDE SERPL-SCNC: 103 MMOL/L (ref 95–110)
CO2 SERPL-SCNC: 28 MMOL/L (ref 23–29)
CREAT SERPL-MCNC: 0.9 MG/DL (ref 0.5–1.4)
ERYTHROCYTE [DISTWIDTH] IN BLOOD BY AUTOMATED COUNT: 17.5 % (ref 11.5–14.5)
EST. GFR  (AFRICAN AMERICAN): >60 ML/MIN/1.73 M^2
EST. GFR  (NON AFRICAN AMERICAN): 58 ML/MIN/1.73 M^2
GLUCOSE SERPL-MCNC: 94 MG/DL (ref 70–110)
HCT VFR BLD AUTO: 36.4 % (ref 37–48.5)
HGB BLD-MCNC: 11.3 G/DL (ref 12–16)
IMM GRANULOCYTES # BLD AUTO: 0.01 K/UL (ref 0–0.04)
MCH RBC QN AUTO: 29.4 PG (ref 27–31)
MCHC RBC AUTO-ENTMCNC: 31 G/DL (ref 32–36)
MCV RBC AUTO: 95 FL (ref 82–98)
NEUTROPHILS # BLD AUTO: 2.8 K/UL (ref 1.8–7.7)
PLATELET # BLD AUTO: 294 K/UL (ref 150–350)
PMV BLD AUTO: 8.5 FL (ref 9.2–12.9)
POTASSIUM SERPL-SCNC: 3.3 MMOL/L (ref 3.5–5.1)
PROT SERPL-MCNC: 6.7 G/DL (ref 6–8.4)
RBC # BLD AUTO: 3.85 M/UL (ref 4–5.4)
SODIUM SERPL-SCNC: 137 MMOL/L (ref 136–145)
WBC # BLD AUTO: 4.47 K/UL (ref 3.9–12.7)

## 2020-12-11 PROCEDURE — 36415 COLL VENOUS BLD VENIPUNCTURE: CPT

## 2020-12-11 PROCEDURE — 1101F PR PT FALLS ASSESS DOC 0-1 FALLS W/OUT INJ PAST YR: ICD-10-PCS | Mod: CPTII,S$GLB,, | Performed by: INTERNAL MEDICINE

## 2020-12-11 PROCEDURE — 80053 COMPREHEN METABOLIC PANEL: CPT

## 2020-12-11 PROCEDURE — 85027 COMPLETE CBC AUTOMATED: CPT

## 2020-12-11 PROCEDURE — 99999 PR PBB SHADOW E&M-EST. PATIENT-LVL III: ICD-10-PCS | Mod: PBBFAC,,, | Performed by: INTERNAL MEDICINE

## 2020-12-11 PROCEDURE — 1159F MED LIST DOCD IN RCRD: CPT | Mod: S$GLB,,, | Performed by: INTERNAL MEDICINE

## 2020-12-11 PROCEDURE — 99215 PR OFFICE/OUTPT VISIT, EST, LEVL V, 40-54 MIN: ICD-10-PCS | Mod: S$GLB,,, | Performed by: INTERNAL MEDICINE

## 2020-12-11 PROCEDURE — 99999 PR PBB SHADOW E&M-EST. PATIENT-LVL III: CPT | Mod: PBBFAC,,, | Performed by: INTERNAL MEDICINE

## 2020-12-11 PROCEDURE — 1126F PR PAIN SEVERITY QUANTIFIED, NO PAIN PRESENT: ICD-10-PCS | Mod: S$GLB,,, | Performed by: INTERNAL MEDICINE

## 2020-12-11 PROCEDURE — 1159F PR MEDICATION LIST DOCUMENTED IN MEDICAL RECORD: ICD-10-PCS | Mod: S$GLB,,, | Performed by: INTERNAL MEDICINE

## 2020-12-11 PROCEDURE — 3288F PR FALLS RISK ASSESSMENT DOCUMENTED: ICD-10-PCS | Mod: CPTII,S$GLB,, | Performed by: INTERNAL MEDICINE

## 2020-12-11 PROCEDURE — 99215 OFFICE O/P EST HI 40 MIN: CPT | Mod: S$GLB,,, | Performed by: INTERNAL MEDICINE

## 2020-12-11 PROCEDURE — 1101F PT FALLS ASSESS-DOCD LE1/YR: CPT | Mod: CPTII,S$GLB,, | Performed by: INTERNAL MEDICINE

## 2020-12-11 PROCEDURE — 1126F AMNT PAIN NOTED NONE PRSNT: CPT | Mod: S$GLB,,, | Performed by: INTERNAL MEDICINE

## 2020-12-11 PROCEDURE — 3288F FALL RISK ASSESSMENT DOCD: CPT | Mod: CPTII,S$GLB,, | Performed by: INTERNAL MEDICINE

## 2020-12-11 RX ORDER — PAZOPANIB 200 MG/1
800 TABLET ORAL DAILY
Qty: 120 TABLET | Refills: 3 | Status: SHIPPED | OUTPATIENT
Start: 2020-12-11 | End: 2020-12-28 | Stop reason: SDUPTHER

## 2020-12-11 NOTE — PROGRESS NOTES
Subjective:       Patient ID: Esperanza Rubalcava is a 87 y.o. female.    Chief Complaint: No chief complaint on file.    HPI     Mrs. Rubalcava returns today for follow-up.  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      Briefly, she is an 87-year-old female who was referred last month for evaluation after a chest x-ray showed a right upper lobe mass and a CT scan of the chest scan showed multiple ground-glass opacities and also a mass at the right hilum.  The patient relates that she has had a chronic cough for the last 3 months and that is what prompted the chest x-ray.    A recent CT of the abdomen and pelvis last week shows a lesion at L4.  A bone scan shows an expansile lesion on the left 11th rib posteriolaterally and the destruction of L4.      Review of Systems    Overall she feels well. She again complains of mild shortness of breath with exertion.  She also complains of her chronic cough which has been present for at least 3 months and it is mainly dry but occasionally productive.  When asked, she states that her appetite is rather poor and she has lost 25 lb this year  The patient denies any anxiety, depression, easy bruising, fevers, chills, night  sweats, nausea, vomiting, diarrhea, constipation, diplopia, blurred vision, headache, chest pain, palpitations, shortness of breath at rest, breast pain, abdominal pain, extremity pain, or difficulty ambulating.  The remainder of the ten-point ROS, including general, skin, lymph, H/N, cardiorespiratory, GI, , Neuro, Endocrine, and psychiatric is negative.     Objective:      Physical Exam      She is alert, oriented to time, place, person, pleasant, well      nourished, in no acute physical distress.   She is accompanied by her daughter, while her 2nd daughter participated over the phone.                                  VITAL SIGNS:  Reviewed                                      HEENT:  Normal.  There are no nasal, oral, lip, gingival,  auricular, lid,    or conjunctival lesions.  Mucosae are moist and pink, and there is no        thrush.  Pupils are equal, reactive to light and accommodation.              Extraocular muscle movements are intact.  Dentition is fair.  There is no frontal or maxillary tenderness.                                     NECK:  Supple without JVD, adenopathy, or thyromegaly.                       LUNGS:   with few scattered rhonchi.  Scoliosis is noted.  There is no tenderness on palpation of the left rib cage posteriorly           CARDIOVASCULAR:  Reveals an S1, S2, no murmurs, no rubs, no gallops.         ABDOMEN:  Soft, nontender, without organomegaly.  Bowel sounds are present.                                                                     EXTREMITIES:  No cyanosis, clubbing, or edema.  There is a scar on the left tibia anteriorly from the removal of her sarcoma.                               BREASTS:   deferred today                    LYMPHATIC:  There is no cervical, axillary, or supraclavicular adenopathy.   SKIN:  Warm and moist, without petechiae, rashes, induration, or ecchymoses.           NEUROLOGIC:  DTRs are 0-1+ bilaterally, symmetrical, motor function is 5/5,  and cranial nerves are  within normal limits.    Assessment:       1. Metastatic leiomyosarcoma   2. SOB (shortness of breath) on exertion    3. Bone metastases    4.        Plan:       I had a long and saskia discussion with Mrs Rubalcava and her two daughters.  I am not inclined to offer Adriamycin given the toxicity and her advance age.  However, I think that pazopanib would be reasonable alternative if we can get it approved.  The pros and cons of such an approach were discussed with her and her daughters as was her expected life expectancy.  We will begin the authorization process and I will see her in 3 weeks for follow-up.  A prescription for Votrient 800 mg a day was sent to the [facility], however, my intent is to treat  her at a lower dose given her advanced age.  I think that 600 mg would be reasonable dose to start.  I also recommended that she obtain a dental clearance in anticipation of bone directed therapy.  Her daughter stated that she was on Prolia every 6 months, however, this was held in March of this year due to COVID.  Mrs. Rubalcava stated that she would not want to be resuscitated in the event of a cardiac or or respiratory arrest.  Her multiple questions and her family's questions were answered to her satisfaction.  Duration of visit was 1 hr including time spent to review her chart.

## 2020-12-14 ENCOUNTER — PATIENT MESSAGE (OUTPATIENT)
Dept: HEMATOLOGY/ONCOLOGY | Facility: CLINIC | Age: 85
End: 2020-12-14

## 2020-12-17 ENCOUNTER — SPECIALTY PHARMACY (OUTPATIENT)
Dept: PHARMACY | Facility: CLINIC | Age: 85
End: 2020-12-17

## 2020-12-17 DIAGNOSIS — C49.22 LEIOMYOSARCOMA OF LEFT THIGH: ICD-10-CM

## 2020-12-18 NOTE — TELEPHONE ENCOUNTER
Received approval for Votrient  PA Case ID: [id number]   Approved through 12/31/2021    Will forward to team to complete BI

## 2020-12-22 NOTE — TELEPHONE ENCOUNTER
Called patient to inform her that Votrient is approved by insurance with an expected co-pay of $100.00 and would like to explain benefits and discuss FA options. [facility] can help patient apply for  assistance if needed. No viola funding available currently. No answer, LVM requesting callback.

## 2020-12-27 ENCOUNTER — PATIENT MESSAGE (OUTPATIENT)
Dept: INTERNAL MEDICINE | Facility: CLINIC | Age: 85
End: 2020-12-27

## 2020-12-28 ENCOUNTER — PATIENT MESSAGE (OUTPATIENT)
Dept: HEMATOLOGY/ONCOLOGY | Facility: CLINIC | Age: 85
End: 2020-12-28

## 2020-12-28 DIAGNOSIS — C49.9 LEIOMYOSARCOMA: ICD-10-CM

## 2020-12-28 RX ORDER — PAZOPANIB 200 MG/1
800 TABLET ORAL DAILY
Qty: 120 TABLET | Refills: 3 | Status: SHIPPED | OUTPATIENT
Start: 2020-12-28

## 2020-12-29 ENCOUNTER — SPECIALTY PHARMACY (OUTPATIENT)
Dept: PHARMACY | Facility: CLINIC | Age: 85
End: 2020-12-29

## 2020-12-29 DIAGNOSIS — C49.22 LEIOMYOSARCOMA OF LEFT THIGH: Primary | ICD-10-CM

## 2020-12-29 NOTE — TELEPHONE ENCOUNTER
Specialty Pharmacy - Initial Clinical Assessment    Specialty Medication Orders Linked to Encounter      Most Recent Value   Medication #1  PAZOpanib (VOTRIENT) 200 mg Tab (Order#[id number], Rx#[id number])        Subjective    Esperanza Rubalcava is a 87 y.o. female, who is followed by the specialty pharmacy service for management and education.    Recent Encounters     Date Type Provider Description    12/29/2020 Specialty Pharmacy Karol Da Silva, Nena Initial Clinical Assessment    12/17/2020 Specialty Pharmacy Karol Da Silva, Nena Referral Authorization        Clinical call attempts since last clinical assessment   No call attempts found.     Today she received education before her first fill with [facility].    Current Outpatient Medications   Medication Sig    aspirin 325 MG tablet Take 325 mg by mouth every 6 (six) hours as needed for Pain (headaches/ pain).    benzonatate (TESSALON) 100 MG capsule TAKE 1 TO 2 CAPSULES BY MOUTH THREE TIMES DAILY AS NEEDED    benzonatate (TESSALON) 100 MG capsule Take 1-2 capsules (100-200 mg total) by mouth 3 (three) times daily as needed.    PAZOpanib (VOTRIENT) 200 mg Tab Take 4 tablets (800 mg) by mouth once daily.   Last reviewed on 12/29/2020  3:00 PM by Karol Da Silva, PharmD    Review of patient's allergies indicates:  No Known AllergiesLast reviewed on  12/29/2020 3:00 PM by Karol Da Silva    Drug Interactions    Drug interactions evaluated: yes  Clinically relevant drug interactions identified: no  Provided the patient with educational material regarding drug interactions: not applicable         Adverse Effects    *All other systems reviewed and are negative       Assessment Questions - Documented Responses      Most Recent Value   Assessment   Medication Reconciliation completed for patient  Yes   During the past 4 weeks, has patient missed any activities due to condition or medication?  No   During the past 4 weeks, did patient have any  "of the following urgent care visits?  None   Goals of Therapy Status  Discussed (new start)   Welcome packet contents reviewed and discussed with patient?  Yes   Assesment completed?  Yes   Plan  Therapy being initiated   Do you need to open a clinical intervention (i-vent)?  No   Do you want to schedule first shipment?  Yes        Refill Questions - Documented Responses      Most Recent Value   Relationship to patient of person spoken to?  Child   HIPAA/medical authority confirmed?  Yes   Can the patient store medication/sharps container properly (at the correct temperature, away from children/pets, etc.)?  Yes   Can the patient call emergency services (911) in the event of an emergency?  Yes   Does the patient have any concerns or questions about taking or administering this medication as prescribed?  No   How many doses does the patient have on hand?  0   During the past 4 weeks, has patient missed any activities due to condition or medication?  No   During the past 4 weeks, did patient have any of the following urgent care visits?  None   How will the patient receive the medication?  Mail   When does the patient need to receive the medication?  12/31/20   Shipping Address  Home   Address in [facility] confirmed and updated if neccessary?  Yes   Expected Copay ($)  100   Is the patient able to afford the medication copay?  Yes   Payment Method  CC on file   Days supply of Refill  30   Refill activity completed?  Yes   Refill activity plan  Refill scheduled   Shipment/Pickup Date:  12/29/20          Objective    She has a past medical history of Closed fracture of distal end of right radius (4/4/2016), Leiomyosarcoma of left thigh, Levoscoliosis, and OP (osteoporosis).    Tried/failed medications: none    BP Readings from Last 4 Encounters:   12/11/20 (!) 141/93   11/30/20 (!) 157/70   11/27/20 (!) 145/90   11/13/20 (!) 153/73     Ht Readings from Last 4 Encounters:   12/11/20 4' 11" (1.499 m)   11/30/20 4' " "11" (1.499 m)   11/27/20 5' (1.524 m)   11/13/20 4' 11" (1.499 m)     Wt Readings from Last 4 Encounters:   12/11/20 43.1 kg (95 lb 0.3 oz)   11/30/20 43.5 kg (96 lb)   11/27/20 43.6 kg (96 lb 1.9 oz)   11/13/20 44.2 kg (97 lb 7.1 oz)     Recent Labs   Lab Result Units 12/11/20  0855 11/30/20  0816 11/13/20  1021 11/02/20  1154   RBC M/uL 3.85 L 4.00 4.12  --    Hemoglobin g/dL 11.3 L 11.7 L 11.9 L  --    Hematocrit % 36.4 L 38.9 38.5  --    WBC K/uL 4.47 4.83 6.49  --    Gran # (ANC) K/uL 2.8 3.1 4.9  --    Gran % %  --  63.6  --   --    Platelets K/uL 294 278 472 H  --    Sodium mmol/L 137  --  137  --    Potassium mmol/L 3.3 L  --  3.7  --    Chloride mmol/L 103  --  100  --    Glucose mg/dL 94  --  101  --    BUN mg/dL 11  --  11  --    Creatinine mg/dL 0.9  --  0.8 0.9   Calcium mg/dL 9.0  --  10.1  --    Total Protein g/dL 6.7  --  7.5  --    Albumin g/dL 3.1 L  --  2.9 L  --    Total Bilirubin mg/dL 0.5  --  0.4  --    Alkaline Phosphatase U/L 72  --  77  --    AST U/L 13  --  15  --    ALT U/L 7 L  --  8 L  --      The goals of cancer treatment include:  · Achieving remission of cancer, if possible  · Reducing tumor size and spread of cancer, if remission is not possible  · Minimizing pain and symptoms of the cancer  · Preventing infection and other complications of treatment  · Promoting adequate nutrition  · Encouraging proper hydration  · Improving or maintaining quality of life  · Maintaining optimal therapy adherence  · Minimizing and managing side effects    Goals of Therapy Status: Discussed (new start)    Assessment/Plan  Patient plans to start therapy on 12/31/20      Indication, dosage, appropriateness, effectiveness, safety and convenience of her specialty medication(s) were reviewed today.     Patient Counseling    Counseled the patient on the following: doses and administration discussed, safe handling, storage, and disposal discussed, possible adverse effects and management discussed, possible " drug and prescription drug interactions discussed, possible drug and OTC drug and food interactions discussed, lab monitoring and follow-up discussed, therapeutic rationale discussed, cost of medications and cost implications discussed, adherence and missed doses discussed, pharmacy contact information discussed       Initial Votrient consult completed on  . Medication to be shipped  for delivery .  $100 copay. Patient plans to start Votrient on .  Confirmed 2 patient identifiers - name and . Therapy Appropriate. Provided information about [facility] (hrs, phone number, process) and shipping ([employer]).    Storage, handling, and disposal reviewed.  Storage: room temperature in a cool, dry area away from pets and children  Handling: oral chemo handout discussed and reviewed. Aware to not handle pills directly and to either use disposable gloves or pour into dosage cup.     Patient was counseled on the administration directions for Votrient:  -Take 4 tablets (800 mg) by mouth once daily on an empty stomach, take either 1 hour before or 2 hours after meals.  Do not chew, crush, or break the tablets.  Discussed the importance of adherence and aware to contact [facility] for any missed or late doses.     Reviewed over common side effects and warnings/precautions for Votrient per PI: fatigue, hair discoloration, hair loss, hypertension, diarrhea, nausea, increased risk for infections, increased risk for bleeding, musculoskeletal pain, mouth sores, decreased appetite, weight loss, and shortness of breath.      H-F syndrome (13%): provided Eucerin cream for any dry, peeling skin and to notify MD for any open wounds  N: sent MD a message to send script for anti-nausea medication to local pharmacy  Diarrhea: aware to use Imodium prn and reach out if > 4 loose stools/day while on Imodium  Infections: monitor for fever > 100.4, chills, SOB. Patient already has a chronic cough and taking Tessalon perrles.  HTN: patient  "does not have any underlying conditions, will follow up at regularly scheduled appts. Aware of importance of labs as well to monitor blood levels.    Warnings/precautions: cardiotoxicity, GI perforation, H-F syndrome, hemorrhage, hepatotoxicity, HTN, ocular toxicity, impaired wound healing, PRES    Medications, allergies and health conditions reviewed. AVOID grapefruit or grapefruit juice. No significant DDI. Aware to contact [facility]/MD for any medication changes.     Daughter, Thania, stated that patient is overall doing well. Her appetite is good and mood is great. She has lost a bit of weight but trying to eat more. Currently living with her other daughter. Energy level is "ok" as she sleeps quite a bit.      Patient confirmed understanding. Patient did not have additional questions.  I will f/u with her in 1 week from start, [facility] to contact patient in 3 weeks for refills.      Tasks added this encounter   No tasks added.   Tasks due within next 3 months   12/29/2020 - Clinical - Initial Assessment (Manual Add)     Karol Da Silva, Nena  [facility]  [address] [place] [id number]  Phone: [phone]  Fax: [phone]    "

## 2020-12-29 NOTE — TELEPHONE ENCOUNTER
Informed patients daughter Page that Votrient is approved by insurance with an expected co-pay of $100.00. Explained benefits and discussed FA options. Daughter voiced understanding. Patient will pay for first fill but is interested in FA for future refills. [facility] will help patient apply for  assistance for next month. Emailing assistance application to patients daughter for patient to get started on for next month. Let daughter know that [facility] will be back in touch for consult.

## 2020-12-30 ENCOUNTER — SPECIALTY PHARMACY (OUTPATIENT)
Dept: PHARMACY | Facility: CLINIC | Age: 85
End: 2020-12-30

## 2020-12-30 ENCOUNTER — PATIENT MESSAGE (OUTPATIENT)
Dept: HEMATOLOGY/ONCOLOGY | Facility: CLINIC | Age: 85
End: 2020-12-30

## 2021-01-04 ENCOUNTER — DOCUMENTATION ONLY (OUTPATIENT)
Dept: HEMATOLOGY/ONCOLOGY | Facility: CLINIC | Age: 86
End: 2021-01-04

## 2021-01-04 ENCOUNTER — TELEPHONE (OUTPATIENT)
Dept: HEMATOLOGY/ONCOLOGY | Facility: CLINIC | Age: 86
End: 2021-01-04

## 2021-01-07 ENCOUNTER — SPECIALTY PHARMACY (OUTPATIENT)
Dept: PHARMACY | Facility: CLINIC | Age: 86
End: 2021-01-07

## 2021-01-08 ENCOUNTER — PATIENT MESSAGE (OUTPATIENT)
Dept: HEMATOLOGY/ONCOLOGY | Facility: CLINIC | Age: 86
End: 2021-01-08

## 2021-01-08 ENCOUNTER — TELEPHONE (OUTPATIENT)
Dept: HEMATOLOGY/ONCOLOGY | Facility: CLINIC | Age: 86
End: 2021-01-08

## 2021-01-08 ENCOUNTER — OFFICE VISIT (OUTPATIENT)
Dept: HEMATOLOGY/ONCOLOGY | Facility: CLINIC | Age: 86
End: 2021-01-08
Payer: MEDICARE

## 2021-01-08 ENCOUNTER — HOSPITAL ENCOUNTER (OUTPATIENT)
Dept: RADIOLOGY | Facility: HOSPITAL | Age: 86
Discharge: HOME | End: 2021-01-08
Attending: INTERNAL MEDICINE
Payer: MEDICARE

## 2021-01-08 VITALS
OXYGEN SATURATION: 94 % | BODY MASS INDEX: 18.27 KG/M2 | DIASTOLIC BLOOD PRESSURE: 88 MMHG | SYSTOLIC BLOOD PRESSURE: 143 MMHG | TEMPERATURE: 98 F | HEART RATE: 117 BPM | WEIGHT: 90.63 LBS | HEIGHT: 59 IN

## 2021-01-08 DIAGNOSIS — C49.22 LEIOMYOSARCOMA OF LEFT THIGH: ICD-10-CM

## 2021-01-08 DIAGNOSIS — R50.9 FEVER: ICD-10-CM

## 2021-01-08 DIAGNOSIS — C49.22 LEIOMYOSARCOMA OF LEFT THIGH: Primary | ICD-10-CM

## 2021-01-08 DIAGNOSIS — R06.02 SHORTNESS OF BREATH: ICD-10-CM

## 2021-01-08 DIAGNOSIS — C78.00 MALIGNANT NEOPLASM METASTATIC TO LUNG, UNSPECIFIED LATERALITY: ICD-10-CM

## 2021-01-08 DIAGNOSIS — R05.9 COUGH: ICD-10-CM

## 2021-01-08 LAB — SARS-COV-2 RDRP RESP QL NAA+PROBE: NEGATIVE

## 2021-01-08 PROCEDURE — 1101F PR PT FALLS ASSESS DOC 0-1 FALLS W/OUT INJ PAST YR: ICD-10-PCS | Mod: CPTII,S$GLB,, | Performed by: INTERNAL MEDICINE

## 2021-01-08 PROCEDURE — 1159F MED LIST DOCD IN RCRD: CPT | Mod: S$GLB,,, | Performed by: INTERNAL MEDICINE

## 2021-01-08 PROCEDURE — 1159F PR MEDICATION LIST DOCUMENTED IN MEDICAL RECORD: ICD-10-PCS | Mod: S$GLB,,, | Performed by: INTERNAL MEDICINE

## 2021-01-08 PROCEDURE — 99215 PR OFFICE/OUTPT VISIT, EST, LEVL V, 40-54 MIN: ICD-10-PCS | Mod: S$GLB,,, | Performed by: INTERNAL MEDICINE

## 2021-01-08 PROCEDURE — 71046 XR CHEST PA AND LATERAL: ICD-10-PCS | Mod: 26,,, | Performed by: RADIOLOGY

## 2021-01-08 PROCEDURE — 99215 OFFICE O/P EST HI 40 MIN: CPT | Mod: S$GLB,,, | Performed by: INTERNAL MEDICINE

## 2021-01-08 PROCEDURE — 3288F PR FALLS RISK ASSESSMENT DOCUMENTED: ICD-10-PCS | Mod: CPTII,S$GLB,, | Performed by: INTERNAL MEDICINE

## 2021-01-08 PROCEDURE — 1126F AMNT PAIN NOTED NONE PRSNT: CPT | Mod: S$GLB,,, | Performed by: INTERNAL MEDICINE

## 2021-01-08 PROCEDURE — U0002 COVID-19 LAB TEST NON-CDC: HCPCS

## 2021-01-08 PROCEDURE — 1101F PT FALLS ASSESS-DOCD LE1/YR: CPT | Mod: CPTII,S$GLB,, | Performed by: INTERNAL MEDICINE

## 2021-01-08 PROCEDURE — 3288F FALL RISK ASSESSMENT DOCD: CPT | Mod: CPTII,S$GLB,, | Performed by: INTERNAL MEDICINE

## 2021-01-08 PROCEDURE — 99999 PR PBB SHADOW E&M-EST. PATIENT-LVL IV: ICD-10-PCS | Mod: PBBFAC,,, | Performed by: INTERNAL MEDICINE

## 2021-01-08 PROCEDURE — 99999 PR PBB SHADOW E&M-EST. PATIENT-LVL IV: CPT | Mod: PBBFAC,,, | Performed by: INTERNAL MEDICINE

## 2021-01-08 PROCEDURE — 71046 X-RAY EXAM CHEST 2 VIEWS: CPT | Mod: 26,,, | Performed by: RADIOLOGY

## 2021-01-08 PROCEDURE — 1126F PR PAIN SEVERITY QUANTIFIED, NO PAIN PRESENT: ICD-10-PCS | Mod: S$GLB,,, | Performed by: INTERNAL MEDICINE

## 2021-01-08 PROCEDURE — 71046 X-RAY EXAM CHEST 2 VIEWS: CPT | Mod: TC

## 2021-01-11 DIAGNOSIS — E46 MALNUTRITION, UNSPECIFIED TYPE: Primary | ICD-10-CM

## 2021-01-11 RX ORDER — MEGESTROL ACETATE 40 MG/ML
SUSPENSION ORAL
Qty: 150 ML | Refills: 11 | Status: SHIPPED | OUTPATIENT
Start: 2021-01-11 | End: 2021-01-13

## 2021-01-13 ENCOUNTER — PATIENT MESSAGE (OUTPATIENT)
Dept: HEMATOLOGY/ONCOLOGY | Facility: CLINIC | Age: 86
End: 2021-01-13

## 2021-01-13 DIAGNOSIS — E46 MALNUTRITION, UNSPECIFIED TYPE: ICD-10-CM

## 2021-01-13 RX ORDER — MEGESTROL ACETATE 40 MG/ML
SUSPENSION ORAL
Qty: 450 ML | Refills: 3 | Status: SHIPPED | OUTPATIENT
Start: 2021-01-13

## 2021-01-14 ENCOUNTER — SPECIALTY PHARMACY (OUTPATIENT)
Dept: PHARMACY | Facility: CLINIC | Age: 86
End: 2021-01-14

## 2021-01-16 ENCOUNTER — IMMUNIZATION (OUTPATIENT)
Dept: INTERNAL MEDICINE | Facility: CLINIC | Age: 86
End: 2021-01-16
Payer: MEDICARE

## 2021-01-16 DIAGNOSIS — Z23 NEED FOR VACCINATION: Primary | ICD-10-CM

## 2021-01-16 PROCEDURE — 91300 COVID-19, MRNA, LNP-S, PF, 30 MCG/0.3 ML DOSE VACCINE: CPT | Mod: PBBFAC | Performed by: FAMILY MEDICINE

## 2021-01-25 ENCOUNTER — PATIENT MESSAGE (OUTPATIENT)
Dept: HEMATOLOGY/ONCOLOGY | Facility: CLINIC | Age: 86
End: 2021-01-25

## 2021-01-26 DIAGNOSIS — R05.3 CHRONIC COUGH: ICD-10-CM

## 2021-01-26 RX ORDER — BENZONATATE 100 MG/1
100-200 CAPSULE ORAL 3 TIMES DAILY PRN
Qty: 60 CAPSULE | Refills: 1 | Status: SHIPPED | OUTPATIENT
Start: 2021-01-26

## 2021-01-29 ENCOUNTER — OFFICE VISIT (OUTPATIENT)
Dept: HEMATOLOGY/ONCOLOGY | Facility: CLINIC | Age: 86
End: 2021-01-29
Payer: MEDICARE

## 2021-01-29 ENCOUNTER — LAB VISIT (OUTPATIENT)
Dept: LAB | Facility: HOSPITAL | Age: 86
End: 2021-01-29
Attending: INTERNAL MEDICINE
Payer: MEDICARE

## 2021-01-29 VITALS
WEIGHT: 100.31 LBS | HEART RATE: 96 BPM | HEIGHT: 60 IN | OXYGEN SATURATION: 99 % | TEMPERATURE: 98 F | BODY MASS INDEX: 19.69 KG/M2 | SYSTOLIC BLOOD PRESSURE: 139 MMHG | DIASTOLIC BLOOD PRESSURE: 85 MMHG

## 2021-01-29 DIAGNOSIS — C78.00 MALIGNANT NEOPLASM METASTATIC TO LUNG, UNSPECIFIED LATERALITY: ICD-10-CM

## 2021-01-29 DIAGNOSIS — C49.22 LEIOMYOSARCOMA OF LEFT THIGH: ICD-10-CM

## 2021-01-29 DIAGNOSIS — C49.22 LEIOMYOSARCOMA OF LEFT THIGH: Primary | ICD-10-CM

## 2021-01-29 LAB
ALBUMIN SERPL BCP-MCNC: 2.3 G/DL (ref 3.5–5.2)
ALP SERPL-CCNC: 66 U/L (ref 55–135)
ALT SERPL W/O P-5'-P-CCNC: 9 U/L (ref 10–44)
ANION GAP SERPL CALC-SCNC: 12 MMOL/L (ref 8–16)
AST SERPL-CCNC: 11 U/L (ref 10–40)
BILIRUB SERPL-MCNC: 0.6 MG/DL (ref 0.1–1)
BUN SERPL-MCNC: 13 MG/DL (ref 8–23)
CALCIUM SERPL-MCNC: 8.8 MG/DL (ref 8.7–10.5)
CHLORIDE SERPL-SCNC: 104 MMOL/L (ref 95–110)
CO2 SERPL-SCNC: 24 MMOL/L (ref 23–29)
CREAT SERPL-MCNC: 0.8 MG/DL (ref 0.5–1.4)
ERYTHROCYTE [DISTWIDTH] IN BLOOD BY AUTOMATED COUNT: 19.9 % (ref 11.5–14.5)
EST. GFR  (AFRICAN AMERICAN): >60 ML/MIN/1.73 M^2
EST. GFR  (NON AFRICAN AMERICAN): >60 ML/MIN/1.73 M^2
GLUCOSE SERPL-MCNC: 93 MG/DL (ref 70–110)
HCT VFR BLD AUTO: 32.1 % (ref 37–48.5)
HGB BLD-MCNC: 10.6 G/DL (ref 12–16)
IMM GRANULOCYTES # BLD AUTO: 0.03 K/UL (ref 0–0.04)
MCH RBC QN AUTO: 32.4 PG (ref 27–31)
MCHC RBC AUTO-ENTMCNC: 33 G/DL (ref 32–36)
MCV RBC AUTO: 98 FL (ref 82–98)
NEUTROPHILS # BLD AUTO: 4.9 K/UL (ref 1.8–7.7)
PLATELET # BLD AUTO: 219 K/UL (ref 150–350)
PMV BLD AUTO: 8.8 FL (ref 9.2–12.9)
POTASSIUM SERPL-SCNC: 2.9 MMOL/L (ref 3.5–5.1)
PROT SERPL-MCNC: 6.3 G/DL (ref 6–8.4)
RBC # BLD AUTO: 3.27 M/UL (ref 4–5.4)
SODIUM SERPL-SCNC: 140 MMOL/L (ref 136–145)
WBC # BLD AUTO: 6.34 K/UL (ref 3.9–12.7)

## 2021-01-29 PROCEDURE — 1157F PR ADVANCE CARE PLAN OR EQUIV PRESENT IN MEDICAL RECORD: ICD-10-PCS | Mod: S$GLB,,, | Performed by: INTERNAL MEDICINE

## 2021-01-29 PROCEDURE — 3288F FALL RISK ASSESSMENT DOCD: CPT | Mod: CPTII,S$GLB,, | Performed by: INTERNAL MEDICINE

## 2021-01-29 PROCEDURE — 1126F AMNT PAIN NOTED NONE PRSNT: CPT | Mod: S$GLB,,, | Performed by: INTERNAL MEDICINE

## 2021-01-29 PROCEDURE — 1159F MED LIST DOCD IN RCRD: CPT | Mod: S$GLB,,, | Performed by: INTERNAL MEDICINE

## 2021-01-29 PROCEDURE — 1100F PTFALLS ASSESS-DOCD GE2>/YR: CPT | Mod: CPTII,S$GLB,, | Performed by: INTERNAL MEDICINE

## 2021-01-29 PROCEDURE — 80053 COMPREHEN METABOLIC PANEL: CPT

## 2021-01-29 PROCEDURE — 99999 PR PBB SHADOW E&M-EST. PATIENT-LVL III: CPT | Mod: PBBFAC,,, | Performed by: INTERNAL MEDICINE

## 2021-01-29 PROCEDURE — 1100F PR PT FALLS ASSESS DOC 2+ FALLS/FALL W/INJURY/YR: ICD-10-PCS | Mod: CPTII,S$GLB,, | Performed by: INTERNAL MEDICINE

## 2021-01-29 PROCEDURE — 1126F PR PAIN SEVERITY QUANTIFIED, NO PAIN PRESENT: ICD-10-PCS | Mod: S$GLB,,, | Performed by: INTERNAL MEDICINE

## 2021-01-29 PROCEDURE — 99999 PR PBB SHADOW E&M-EST. PATIENT-LVL III: ICD-10-PCS | Mod: PBBFAC,,, | Performed by: INTERNAL MEDICINE

## 2021-01-29 PROCEDURE — 99214 OFFICE O/P EST MOD 30 MIN: CPT | Mod: S$GLB,,, | Performed by: INTERNAL MEDICINE

## 2021-01-29 PROCEDURE — 1157F ADVNC CARE PLAN IN RCRD: CPT | Mod: S$GLB,,, | Performed by: INTERNAL MEDICINE

## 2021-01-29 PROCEDURE — 3288F PR FALLS RISK ASSESSMENT DOCUMENTED: ICD-10-PCS | Mod: CPTII,S$GLB,, | Performed by: INTERNAL MEDICINE

## 2021-01-29 PROCEDURE — 1159F PR MEDICATION LIST DOCUMENTED IN MEDICAL RECORD: ICD-10-PCS | Mod: S$GLB,,, | Performed by: INTERNAL MEDICINE

## 2021-01-29 PROCEDURE — 36415 COLL VENOUS BLD VENIPUNCTURE: CPT

## 2021-01-29 PROCEDURE — 99214 PR OFFICE/OUTPT VISIT, EST, LEVL IV, 30-39 MIN: ICD-10-PCS | Mod: S$GLB,,, | Performed by: INTERNAL MEDICINE

## 2021-01-29 PROCEDURE — 85027 COMPLETE CBC AUTOMATED: CPT

## 2021-01-31 ENCOUNTER — PATIENT MESSAGE (OUTPATIENT)
Dept: HEMATOLOGY/ONCOLOGY | Facility: CLINIC | Age: 86
End: 2021-01-31

## 2021-02-02 ENCOUNTER — TELEPHONE (OUTPATIENT)
Dept: HEMATOLOGY/ONCOLOGY | Facility: CLINIC | Age: 86
End: 2021-02-02

## 2021-02-05 ENCOUNTER — LAB VISIT (OUTPATIENT)
Dept: LAB | Facility: HOSPITAL | Age: 86
End: 2021-02-05
Attending: INTERNAL MEDICINE
Payer: MEDICARE

## 2021-02-05 DIAGNOSIS — C49.22 LEIOMYOSARCOMA OF LEFT THIGH: ICD-10-CM

## 2021-02-05 DIAGNOSIS — C78.00 MALIGNANT NEOPLASM METASTATIC TO LUNG, UNSPECIFIED LATERALITY: ICD-10-CM

## 2021-02-05 PROCEDURE — 80048 BASIC METABOLIC PNL TOTAL CA: CPT

## 2021-02-05 PROCEDURE — 36415 COLL VENOUS BLD VENIPUNCTURE: CPT

## 2021-02-06 ENCOUNTER — IMMUNIZATION (OUTPATIENT)
Dept: INTERNAL MEDICINE | Facility: CLINIC | Age: 86
End: 2021-02-06
Payer: MEDICARE

## 2021-02-06 DIAGNOSIS — Z23 NEED FOR VACCINATION: Primary | ICD-10-CM

## 2021-02-06 LAB
ANION GAP SERPL CALC-SCNC: 12 MMOL/L (ref 8–16)
BUN SERPL-MCNC: 16 MG/DL (ref 8–23)
CALCIUM SERPL-MCNC: 8.9 MG/DL (ref 8.7–10.5)
CHLORIDE SERPL-SCNC: 105 MMOL/L (ref 95–110)
CO2 SERPL-SCNC: 25 MMOL/L (ref 23–29)
CREAT SERPL-MCNC: 0.9 MG/DL (ref 0.5–1.4)
EST. GFR  (AFRICAN AMERICAN): >60 ML/MIN/1.73 M^2
EST. GFR  (NON AFRICAN AMERICAN): 57.3 ML/MIN/1.73 M^2
GLUCOSE SERPL-MCNC: 135 MG/DL (ref 70–110)
POTASSIUM SERPL-SCNC: 4.2 MMOL/L (ref 3.5–5.1)
SODIUM SERPL-SCNC: 142 MMOL/L (ref 136–145)

## 2021-02-06 PROCEDURE — 91300 COVID-19, MRNA, LNP-S, PF, 30 MCG/0.3 ML DOSE VACCINE: CPT | Mod: PBBFAC | Performed by: FAMILY MEDICINE

## 2021-02-06 PROCEDURE — 0002A COVID-19, MRNA, LNP-S, PF, 30 MCG/0.3 ML DOSE VACCINE: CPT | Mod: PBBFAC | Performed by: FAMILY MEDICINE

## 2021-02-11 ENCOUNTER — DOCUMENTATION ONLY (OUTPATIENT)
Dept: HEMATOLOGY/ONCOLOGY | Facility: CLINIC | Age: 86
End: 2021-02-11

## 2021-02-11 DIAGNOSIS — C79.89 METASTATIC SARCOMA: Primary | ICD-10-CM

## 2021-02-12 ENCOUNTER — DOCUMENTATION ONLY (OUTPATIENT)
Dept: HEMATOLOGY/ONCOLOGY | Facility: CLINIC | Age: 86
End: 2021-02-12

## 2021-02-25 ENCOUNTER — DOCUMENT SCAN (OUTPATIENT)
Dept: HOME HEALTH SERVICES | Facility: HOSPITAL | Age: 86
End: 2021-02-25
Payer: MEDICARE